# Patient Record
Sex: MALE | Race: BLACK OR AFRICAN AMERICAN | NOT HISPANIC OR LATINO | Employment: UNEMPLOYED | URBAN - METROPOLITAN AREA
[De-identification: names, ages, dates, MRNs, and addresses within clinical notes are randomized per-mention and may not be internally consistent; named-entity substitution may affect disease eponyms.]

---

## 2022-05-10 NOTE — TELEPHONE ENCOUNTER
DIAGNOSIS: L knee,Malcolm Jacobsen,[place] [facility] [place]past arthroscopic surgery 2018   APPOINTMENT DATE: 5.18.22   NOTES STATUS DETAILS   OFFICE NOTE from other specialist Care Everywhere 8.23.21 Jenni Herring, [facility]   OPERATIVE REPORT In process    MEDICATION LIST Internal    MRI PACS 1.28.22 L knee, [place]  9.17.15 L knee, [facility]  3.2.04 L knee, [facility]   XRAYS (IMAGES & REPORTS) PACS 8.23.21 L knee, [facility]  8.24.05 L knee, [facility]  5.1.03 L knee, [facility]     Action 5.10.22 1:27 PM BRU[facility]   Action Taken Faxed request to [place] for records [phone] and [facility] [phone] for images      Action 5.16.22 7:31 AM BRUCE   Action Taken [facility] images resolved. Refaxed request to [place]     Action 5.18.22 7:32 AM BRU[facility]   Action Taken Called [facility] to have image pushed. Resolved.

## 2022-05-18 ENCOUNTER — PRE VISIT (OUTPATIENT)
Dept: ORTHOPEDICS | Facility: CLINIC | Age: 32
End: 2022-05-18
Payer: COMMERCIAL

## 2022-05-18 ENCOUNTER — DOCUMENTATION ONLY (OUTPATIENT)
Dept: ORTHOPEDICS | Facility: CLINIC | Age: 32
End: 2022-05-18

## 2022-05-18 ENCOUNTER — OFFICE VISIT (OUTPATIENT)
Dept: ORTHOPEDICS | Facility: CLINIC | Age: 32
End: 2022-05-18
Payer: COMMERCIAL

## 2022-05-18 VITALS — HEIGHT: 71 IN | WEIGHT: 235 LBS | BODY MASS INDEX: 32.9 KG/M2

## 2022-05-18 DIAGNOSIS — M25.562 CHRONIC PAIN OF LEFT KNEE: Primary | ICD-10-CM

## 2022-05-18 DIAGNOSIS — G89.29 CHRONIC PAIN OF LEFT KNEE: Primary | ICD-10-CM

## 2022-05-18 PROCEDURE — 99204 OFFICE O/P NEW MOD 45 MIN: CPT | Mod: GC | Performed by: ORTHOPAEDIC SURGERY

## 2022-05-18 NOTE — LETTER
5/18/2022         RE: Misha Buckner  [address] [facility] [address]        Dear Colleague,    Thank you for referring your patient, Misha Buckner, to the [facility] [place]. Please see a copy of my visit note below.    CHIEF CONCERN: Left knee pain    HISTORY:   Misha is a pleasant 32-year-old gentleman who presents today for evaluation of ongoing left knee pain.  This has been going on since the end of 2021.  He reports initially feeling an injury after stepping wrong coming down the stairs since then has had issues.  He has had 2 prior surgeries on his left knee which she reports are arthroscopic surgeries unsure of ultimately what was done.  This most recent pain that he has been experiencing however is relatively new since those procedures.      Reports that he continues to have knee swelling and that he has difficulty standing on his feet for prolonged periods of time.  He reports because of this knee pain he has not been able to continue working.  Pain is predominantly on the medial aspect of his knee.  This has not improved over the last 6 months or so.  He rates his left knee a SANE score of 30 out of 100.    PAST MEDICAL HISTORY: (Reviewed with the patient and in the [facility] medical record)  1. No reported past medical history    PAST SURGICAL HISTORY: (Reviewed with the patient and in the [facility] medical record)  1. Reports 2 prior knee arthroscopies on the left knee    MEDICATIONS: (Reviewed with the patient and in the [facility] medical record)    Notable medications include: Lisinopril    ALLERGIES: (Reviewed with the patient and in the [facility] medical record)  1. No known allergies      SOCIAL HISTORY: (Reviewed with the patient and in the medical record)  --Tobacco: Denies  --Occupation: Previously doing [employer] work  --Avocation/Sport: Would like to get back to standing for longer periods of time like barbecuing, and return to basketball    FAMILY HISTORY:  (Reviewed with the patient and in the medical record)  -- No family history of bleeding, clotting, or difficulty with anesthesia    REVIEW OF SYSTEMS: (Reviewed with the patient and on the health intake form)  -- A comprehensive 10 point review of systems was conducted and is negative except as noted in the HPI    EXAM:     General: Awake, Alert and Oriented, No acute Distress. Articulate and Interactive    Body mass index is 32.78 kg/m .    Left lower extremity :    Skin is Warm and Well perfused, no suggestion of infection    No significant effusion    Range of motion from -2 to approximately 95 degrees prior to onset of some discomfort.  Cannot obtain further flexion with discomfort.    No pain with patellar mobility.    Negative Lachman.  Negative posterior drawer    Stable to varus and valgus stress at 0 and 30 degrees    No significant tenderness to medial or lateral joint line.    He does have exquisite tenderness to palpation of the medial femoral condyle as well as somewhat in the area of the VMO.    Equivocal Alie testing    EHL/FHL/TA/GS 5/5    Sensation intact L3-S1    2+ Dorsalis Pedis Pulse    IMAGING:    Plain Radiographs: Prior radiographs from August 2021 are available for review.  This demonstrates mild medial joint space narrowing as well as a medial femoral condyle osteochondral defect.    MRI: MRI of the left knee from January 2022 demonstrates predominantly a medial femoral condyle osteochondral defect.    ASSESSMENT:  1. Left knee medial femoral condyle osteochondral defect.    Examination and imaging findings were discussed with Misha today.  We discussed that the likelihood of his osteochondral defect healing without any intervention is very low.  We feel that intervention would be required for reliable symptom relief.  We discussed that this would likely be a staged approach, however we first offered a knee arthroscopy with debridement and ultimately evaluation of his osteochondral  defect for further planning.  We did discuss that if this were to alleviate his symptoms no further intervention would be offered.  Should he remain symptomatic, however a second procedure including osteochondral allograft would likely be necessary.  We will obtain full-length alignment radiographs today to evaluate for any possible need for osteotomy.  The risks and benefits of the above surgical procedures were thoroughly discussed with him and he would like to proceed.  We will plan for this first stage procedure at his convenience.  He will call us in the meantime with any questions or concerns.    PLAN:  1. Obtain standing alignment radiographs today  2. Plan for staged surgical procedures.  First procedure would be diagnostic left knee arthroscopy with chondroplasty.  This will allow us to evaluate his knee for potential further surgical procedures.   3. If his symptoms were to fail to improve we would likely follow in the future with osteochondral allograft.    Marcus Mancia MD  Orthopedic sports medicine fellow        Patient seen and examined with the fellow.     Assesment: Left knee pain, swelling and full-thickness chondral defect medial femoral condyle.  Focal in origin    Plan: I had a long discussion with the patient.  Reviewed with him his cartilage defect and the potential treatment options.  I have offered him a staged approach.  The surgical plan will be examination under anesthesia left knee, chondroplasty, staging for future cartilage surgery.  If he sees good improvement then no further intervention will be necessary.  If he fails to see lasting improvement I would recommend osteochondral allograft transplantation (likely) and proximal tibial osteotomy.    We do need to get standing alignment films today to confirm his lower extremity alignment.  Note we would not plan to couple an osteotomy surgery until the second cartilage surgery.    I discussed with him the pros cons risk and benefits of  surgery.  We discussed the expected course of recovery alternative treatment options.  We will look for time to schedule and this can be complete.    I agree with history, physical and imaging as well as the assessment and plan as detailed by Dr. Mancia.         Again, thank you for allowing me to participate in the care of your patient.        Sincerely,        Ralf Robertson MD

## 2022-05-18 NOTE — NURSING NOTE
Teaching Flowsheet   Relevant Diagnosis: Left knee arthroscopy, chondroplasty and debridement, staging for future cartilage surgeries  Teaching Topic: Pre operative instructions     Person(s) involved in teaching:   Patient     Motivation Level:  Asks Questions: Yes  Eager to Learn: Yes  Cooperative: Yes  Receptive (willing/able to accept information): Yes  Any cultural factors/Hinduism beliefs that may influence understanding or compliance? No       Patient demonstrates understanding of the following:  Reason for the appointment, diagnosis and treatment plan: Yes  Knowledge of proper use of medications and conditions for which they are ordered (with special attention to potential side effects or drug interactions): Yes  Which situations necessitate calling provider and whom to contact: Yes       Teaching Concerns Addressed: Pre operative care       Proper use and care of brace and crutches (medical equip, care aids, etc.): No, explain: will be provided at surgery  Nutritional needs and diet plan: Yes  Pain management techniques: Yes  Wound Care: Yes  How and/when to access community resources: Yes     Instructional Materials Used/Given: Pre operative instructions and surgical soap     Time spent with patient: 15 minutes.    **Patient is scheduled for surgery at the [facility] on 6/7/22. He understands that he will need a pre-operative physical within 30 days of surgery he plans to do this with his PCP. The patient also understands that he needs a covid test within 4 days of surgery.**

## 2022-05-18 NOTE — PROGRESS NOTES
CHIEF CONCERN: Left knee pain    HISTORY:   Misha is a pleasant 32-year-old gentleman who presents today for evaluation of ongoing left knee pain.  This has been going on since the end of 2021.  He reports initially feeling an injury after stepping wrong coming down the stairs since then has had issues.  He has had 2 prior surgeries on his left knee which she reports are arthroscopic surgeries unsure of ultimately what was done.  This most recent pain that he has been experiencing however is relatively new since those procedures.      Reports that he continues to have knee swelling and that he has difficulty standing on his feet for prolonged periods of time.  He reports because of this knee pain he has not been able to continue working.  Pain is predominantly on the medial aspect of his knee.  This has not improved over the last 6 months or so.  He rates his left knee a SANE score of 30 out of 100.    PAST MEDICAL HISTORY: (Reviewed with the patient and in the [facility] medical record)  1. No reported past medical history    PAST SURGICAL HISTORY: (Reviewed with the patient and in the [facility] medical record)  1. Reports 2 prior knee arthroscopies on the left knee    MEDICATIONS: (Reviewed with the patient and in the [facility] medical record)    Notable medications include: Lisinopril    ALLERGIES: (Reviewed with the patient and in the [facility] medical record)  1. No known allergies      SOCIAL HISTORY: (Reviewed with the patient and in the medical record)  --Tobacco: Denies  --Occupation: Previously doing [employer] work  --Avocation/Sport: Would like to get back to standing for longer periods of time like barbecuing, and return to basketball    FAMILY HISTORY: (Reviewed with the patient and in the medical record)  -- No family history of bleeding, clotting, or difficulty with anesthesia    REVIEW OF SYSTEMS: (Reviewed with the patient and on the health intake form)  -- A comprehensive 10 point review of systems was conducted and  is negative except as noted in the HPI    EXAM:     General: Awake, Alert and Oriented, No acute Distress. Articulate and Interactive    Body mass index is 32.78 kg/m .    Left lower extremity :    Skin is Warm and Well perfused, no suggestion of infection    No significant effusion    Range of motion from -2 to approximately 95 degrees prior to onset of some discomfort.  Cannot obtain further flexion with discomfort.    No pain with patellar mobility.    Negative Lachman.  Negative posterior drawer    Stable to varus and valgus stress at 0 and 30 degrees    No significant tenderness to medial or lateral joint line.    He does have exquisite tenderness to palpation of the medial femoral condyle as well as somewhat in the area of the VMO.    Equivocal Alie testing    EHL/FHL/TA/GS 5/5    Sensation intact L3-S1    2+ Dorsalis Pedis Pulse    IMAGING:    Plain Radiographs: Prior radiographs from August 2021 are available for review.  This demonstrates mild medial joint space narrowing as well as a medial femoral condyle osteochondral defect.    MRI: MRI of the left knee from January 2022 demonstrates predominantly a medial femoral condyle osteochondral defect.    ASSESSMENT:  1. Left knee medial femoral condyle osteochondral defect.    Examination and imaging findings were discussed with Misha today.  We discussed that the likelihood of his osteochondral defect healing without any intervention is very low.  We feel that intervention would be required for reliable symptom relief.  We discussed that this would likely be a staged approach, however we first offered a knee arthroscopy with debridement and ultimately evaluation of his osteochondral defect for further planning.  We did discuss that if this were to alleviate his symptoms no further intervention would be offered.  Should he remain symptomatic, however a second procedure including osteochondral allograft would likely be necessary.  We will obtain  full-length alignment radiographs today to evaluate for any possible need for osteotomy.  The risks and benefits of the above surgical procedures were thoroughly discussed with him and he would like to proceed.  We will plan for this first stage procedure at his convenience.  He will call us in the meantime with any questions or concerns.    PLAN:  1. Obtain standing alignment radiographs today  2. Plan for staged surgical procedures.  First procedure would be diagnostic left knee arthroscopy with chondroplasty.  This will allow us to evaluate his knee for potential further surgical procedures.   3. If his symptoms were to fail to improve we would likely follow in the future with osteochondral allograft.    Marcus Mancia MD  Orthopedic sports medicine fellow

## 2022-05-18 NOTE — PROGRESS NOTES
Patient seen and examined with the fellow.     Assesment: Left knee pain, swelling and full-thickness chondral defect medial femoral condyle.  Focal in origin    Plan: I had a long discussion with the patient.  Reviewed with him his cartilage defect and the potential treatment options.  I have offered him a staged approach.  The surgical plan will be examination under anesthesia left knee, chondroplasty, staging for future cartilage surgery.  If he sees good improvement then no further intervention will be necessary.  If he fails to see lasting improvement I would recommend osteochondral allograft transplantation (likely) and proximal tibial osteotomy.    We do need to get standing alignment films today to confirm his lower extremity alignment.  Note we would not plan to couple an osteotomy surgery until the second cartilage surgery.    I discussed with him the pros cons risk and benefits of surgery.  We discussed the expected course of recovery alternative treatment options.  We will look for time to schedule and this can be complete.    I agree with history, physical and imaging as well as the assessment and plan as detailed by Dr. Mancia.

## 2022-05-18 NOTE — PROGRESS NOTES
Patient is scheduled for surgery with Dr. Robertson    Spoke with: Meaghan, scheduled in clinic    Date of Surgery: 6/7/22    Location: [facility]    Post op: 1 week    Pre op with Provider: Complete    H&P: Patient will schedule with PCP    Pre-procedure COVID-19 Test: Patient will wait for call to schedule    Additional imaging/appointments: N/A    Surgery packet: Received in clinic     Additional comments: N/A

## 2022-05-27 DIAGNOSIS — Z11.59 ENCOUNTER FOR SCREENING FOR OTHER VIRAL DISEASES: Primary | ICD-10-CM

## 2022-06-01 ENCOUNTER — DOCUMENTATION ONLY (OUTPATIENT)
Dept: ORTHOPEDICS | Facility: CLINIC | Age: 32
End: 2022-06-01
Payer: COMMERCIAL

## 2022-06-03 ENCOUNTER — LAB (OUTPATIENT)
Dept: LAB | Facility: CLINIC | Age: 32
End: 2022-06-03
Attending: FAMILY MEDICINE
Payer: COMMERCIAL

## 2022-06-03 DIAGNOSIS — Z20.822 ENCOUNTER FOR LABORATORY TESTING FOR COVID-19 VIRUS: ICD-10-CM

## 2022-06-03 LAB — SARS-COV-2 RNA RESP QL NAA+PROBE: NEGATIVE

## 2022-06-03 PROCEDURE — U0003 INFECTIOUS AGENT DETECTION BY NUCLEIC ACID (DNA OR RNA); SEVERE ACUTE RESPIRATORY SYNDROME CORONAVIRUS 2 (SARS-COV-2) (CORONAVIRUS DISEASE [COVID-19]), AMPLIFIED PROBE TECHNIQUE, MAKING USE OF HIGH THROUGHPUT TECHNOLOGIES AS DESCRIBED BY CMS-2020-01-R: HCPCS | Mod: 90 | Performed by: PATHOLOGY

## 2022-06-03 PROCEDURE — U0005 INFEC AGEN DETEC AMPLI PROBE: HCPCS | Mod: 90 | Performed by: PATHOLOGY

## 2022-06-03 PROCEDURE — 99000 SPECIMEN HANDLING OFFICE-LAB: CPT | Performed by: PATHOLOGY

## 2022-06-06 ENCOUNTER — ANESTHESIA EVENT (OUTPATIENT)
Dept: SURGERY | Facility: AMBULATORY SURGERY CENTER | Age: 32
End: 2022-06-06
Payer: COMMERCIAL

## 2022-06-07 ENCOUNTER — HOSPITAL ENCOUNTER (OUTPATIENT)
Facility: AMBULATORY SURGERY CENTER | Age: 32
Discharge: HOME | End: 2022-06-07
Attending: ORTHOPAEDIC SURGERY
Payer: COMMERCIAL

## 2022-06-07 ENCOUNTER — ANESTHESIA (OUTPATIENT)
Dept: SURGERY | Facility: AMBULATORY SURGERY CENTER | Age: 32
End: 2022-06-07
Payer: COMMERCIAL

## 2022-06-07 VITALS
TEMPERATURE: 97 F | HEART RATE: 82 BPM | OXYGEN SATURATION: 96 % | SYSTOLIC BLOOD PRESSURE: 154 MMHG | RESPIRATION RATE: 14 BRPM | BODY MASS INDEX: 32.9 KG/M2 | HEIGHT: 71 IN | DIASTOLIC BLOOD PRESSURE: 104 MMHG | WEIGHT: 235 LBS

## 2022-06-07 DIAGNOSIS — M25.562 CHRONIC PAIN OF LEFT KNEE: ICD-10-CM

## 2022-06-07 DIAGNOSIS — G89.29 CHRONIC PAIN OF LEFT KNEE: ICD-10-CM

## 2022-06-07 PROCEDURE — 29877 ARTHRS KNEE SURG DBRDMT/SHVG: CPT | Mod: LT

## 2022-06-07 PROCEDURE — 29877 ARTHRS KNEE SURG DBRDMT/SHVG: CPT | Mod: LT | Performed by: ORTHOPAEDIC SURGERY

## 2022-06-07 RX ORDER — PROPOFOL 10 MG/ML
INJECTION, EMULSION INTRAVENOUS CONTINUOUS PRN
Status: DISCONTINUED | OUTPATIENT
Start: 2022-06-07 | End: 2022-06-07

## 2022-06-07 RX ORDER — LIDOCAINE HYDROCHLORIDE 20 MG/ML
INJECTION, SOLUTION INFILTRATION; PERINEURAL PRN
Status: DISCONTINUED | OUTPATIENT
Start: 2022-06-07 | End: 2022-06-07

## 2022-06-07 RX ORDER — OXYCODONE HYDROCHLORIDE 5 MG/1
5 TABLET ORAL EVERY 4 HOURS PRN
Status: DISCONTINUED | OUTPATIENT
Start: 2022-06-07 | End: 2022-06-08 | Stop reason: HOSPADM

## 2022-06-07 RX ORDER — CEFAZOLIN SODIUM 2 G/50ML
2 SOLUTION INTRAVENOUS SEE ADMIN INSTRUCTIONS
Status: DISCONTINUED | OUTPATIENT
Start: 2022-06-07 | End: 2022-06-07 | Stop reason: HOSPADM

## 2022-06-07 RX ORDER — AMLODIPINE BESYLATE 10 MG/1
10 TABLET ORAL DAILY
COMMUNITY
Start: 2022-06-06 | End: 2024-01-11

## 2022-06-07 RX ORDER — ACETAMINOPHEN 325 MG/1
975 TABLET ORAL ONCE
Status: COMPLETED | OUTPATIENT
Start: 2022-06-07 | End: 2022-06-07

## 2022-06-07 RX ORDER — ONDANSETRON 4 MG/1
4 TABLET, ORALLY DISINTEGRATING ORAL EVERY 30 MIN PRN
Status: DISCONTINUED | OUTPATIENT
Start: 2022-06-07 | End: 2022-06-08 | Stop reason: HOSPADM

## 2022-06-07 RX ORDER — OXYCODONE HYDROCHLORIDE 5 MG/1
5 TABLET ORAL
Status: COMPLETED | OUTPATIENT
Start: 2022-06-07 | End: 2022-06-07

## 2022-06-07 RX ORDER — SODIUM CHLORIDE, SODIUM LACTATE, POTASSIUM CHLORIDE, CALCIUM CHLORIDE 600; 310; 30; 20 MG/100ML; MG/100ML; MG/100ML; MG/100ML
INJECTION, SOLUTION INTRAVENOUS CONTINUOUS PRN
Status: DISCONTINUED | OUTPATIENT
Start: 2022-06-07 | End: 2022-06-07

## 2022-06-07 RX ORDER — FENTANYL CITRATE 50 UG/ML
25 INJECTION, SOLUTION INTRAMUSCULAR; INTRAVENOUS
Status: DISCONTINUED | OUTPATIENT
Start: 2022-06-07 | End: 2022-06-08 | Stop reason: HOSPADM

## 2022-06-07 RX ORDER — PROPOFOL 10 MG/ML
INJECTION, EMULSION INTRAVENOUS PRN
Status: DISCONTINUED | OUTPATIENT
Start: 2022-06-07 | End: 2022-06-07

## 2022-06-07 RX ORDER — HYDROMORPHONE HYDROCHLORIDE 1 MG/ML
0.2 INJECTION, SOLUTION INTRAMUSCULAR; INTRAVENOUS; SUBCUTANEOUS EVERY 5 MIN PRN
Status: DISCONTINUED | OUTPATIENT
Start: 2022-06-07 | End: 2022-06-07 | Stop reason: HOSPADM

## 2022-06-07 RX ORDER — SODIUM CHLORIDE, SODIUM LACTATE, POTASSIUM CHLORIDE, CALCIUM CHLORIDE 600; 310; 30; 20 MG/100ML; MG/100ML; MG/100ML; MG/100ML
INJECTION, SOLUTION INTRAVENOUS CONTINUOUS
Status: DISCONTINUED | OUTPATIENT
Start: 2022-06-07 | End: 2022-06-08 | Stop reason: HOSPADM

## 2022-06-07 RX ORDER — ONDANSETRON 4 MG/1
4 TABLET, ORALLY DISINTEGRATING ORAL
Status: DISCONTINUED | OUTPATIENT
Start: 2022-06-07 | End: 2022-06-08 | Stop reason: HOSPADM

## 2022-06-07 RX ORDER — OXYCODONE HYDROCHLORIDE 5 MG/1
5-10 TABLET ORAL EVERY 4 HOURS PRN
Qty: 10 TABLET | Refills: 0 | Status: ON HOLD | OUTPATIENT
Start: 2022-06-07 | End: 2022-10-12

## 2022-06-07 RX ORDER — LIDOCAINE 40 MG/G
CREAM TOPICAL
Status: DISCONTINUED | OUTPATIENT
Start: 2022-06-07 | End: 2022-06-07 | Stop reason: HOSPADM

## 2022-06-07 RX ORDER — ACETAMINOPHEN 325 MG/1
650 TABLET ORAL EVERY 4 HOURS PRN
Qty: 50 TABLET | Refills: 1 | Status: ON HOLD | OUTPATIENT
Start: 2022-06-07 | End: 2022-10-12

## 2022-06-07 RX ORDER — KETOROLAC TROMETHAMINE 30 MG/ML
INJECTION, SOLUTION INTRAMUSCULAR; INTRAVENOUS PRN
Status: DISCONTINUED | OUTPATIENT
Start: 2022-06-07 | End: 2022-06-07

## 2022-06-07 RX ORDER — HYDROXYZINE HYDROCHLORIDE 25 MG/1
25 TABLET, FILM COATED ORAL
Status: DISCONTINUED | OUTPATIENT
Start: 2022-06-07 | End: 2022-06-08 | Stop reason: HOSPADM

## 2022-06-07 RX ORDER — ONDANSETRON 2 MG/ML
4 INJECTION INTRAMUSCULAR; INTRAVENOUS EVERY 30 MIN PRN
Status: DISCONTINUED | OUTPATIENT
Start: 2022-06-07 | End: 2022-06-08 | Stop reason: HOSPADM

## 2022-06-07 RX ORDER — AMOXICILLIN 250 MG
1-2 CAPSULE ORAL 2 TIMES DAILY
Qty: 30 TABLET | Refills: 0 | Status: ON HOLD | OUTPATIENT
Start: 2022-06-07 | End: 2022-10-12

## 2022-06-07 RX ORDER — ACETAMINOPHEN 325 MG/1
975 TABLET ORAL ONCE
Status: DISCONTINUED | OUTPATIENT
Start: 2022-06-07 | End: 2022-06-07 | Stop reason: HOSPADM

## 2022-06-07 RX ORDER — FENTANYL CITRATE 50 UG/ML
25 INJECTION, SOLUTION INTRAMUSCULAR; INTRAVENOUS EVERY 5 MIN PRN
Status: DISCONTINUED | OUTPATIENT
Start: 2022-06-07 | End: 2022-06-07 | Stop reason: HOSPADM

## 2022-06-07 RX ORDER — CEFAZOLIN SODIUM 2 G/50ML
2 SOLUTION INTRAVENOUS
Status: DISCONTINUED | OUTPATIENT
Start: 2022-06-07 | End: 2022-06-07 | Stop reason: HOSPADM

## 2022-06-07 RX ORDER — LABETALOL HYDROCHLORIDE 5 MG/ML
10 INJECTION, SOLUTION INTRAVENOUS
Status: DISCONTINUED | OUTPATIENT
Start: 2022-06-07 | End: 2022-06-07 | Stop reason: HOSPADM

## 2022-06-07 RX ORDER — SODIUM CHLORIDE, SODIUM LACTATE, POTASSIUM CHLORIDE, CALCIUM CHLORIDE 600; 310; 30; 20 MG/100ML; MG/100ML; MG/100ML; MG/100ML
INJECTION, SOLUTION INTRAVENOUS CONTINUOUS
Status: DISCONTINUED | OUTPATIENT
Start: 2022-06-07 | End: 2022-06-07 | Stop reason: HOSPADM

## 2022-06-07 RX ORDER — GLYCOPYRROLATE 0.2 MG/ML
INJECTION, SOLUTION INTRAMUSCULAR; INTRAVENOUS PRN
Status: DISCONTINUED | OUTPATIENT
Start: 2022-06-07 | End: 2022-06-07

## 2022-06-07 RX ORDER — MEPERIDINE HYDROCHLORIDE 25 MG/ML
12.5 INJECTION INTRAMUSCULAR; INTRAVENOUS; SUBCUTANEOUS
Status: DISCONTINUED | OUTPATIENT
Start: 2022-06-07 | End: 2022-06-08 | Stop reason: HOSPADM

## 2022-06-07 RX ORDER — FENTANYL CITRATE 50 UG/ML
INJECTION, SOLUTION INTRAMUSCULAR; INTRAVENOUS PRN
Status: DISCONTINUED | OUTPATIENT
Start: 2022-06-07 | End: 2022-06-07

## 2022-06-07 RX ORDER — ONDANSETRON 2 MG/ML
INJECTION INTRAMUSCULAR; INTRAVENOUS PRN
Status: DISCONTINUED | OUTPATIENT
Start: 2022-06-07 | End: 2022-06-07

## 2022-06-07 RX ORDER — DEXAMETHASONE SODIUM PHOSPHATE 4 MG/ML
INJECTION, SOLUTION INTRA-ARTICULAR; INTRALESIONAL; INTRAMUSCULAR; INTRAVENOUS; SOFT TISSUE PRN
Status: DISCONTINUED | OUTPATIENT
Start: 2022-06-07 | End: 2022-06-07

## 2022-06-07 RX ORDER — BUPIVACAINE HYDROCHLORIDE AND EPINEPHRINE 2.5; 5 MG/ML; UG/ML
INJECTION, SOLUTION INFILTRATION; PERINEURAL PRN
Status: DISCONTINUED | OUTPATIENT
Start: 2022-06-07 | End: 2022-06-07 | Stop reason: HOSPADM

## 2022-06-07 RX ORDER — ACETAMINOPHEN 325 MG/1
650 TABLET ORAL
Status: DISCONTINUED | OUTPATIENT
Start: 2022-06-07 | End: 2022-06-08 | Stop reason: HOSPADM

## 2022-06-07 RX ADMIN — PROPOFOL 300 MG: 10 INJECTION, EMULSION INTRAVENOUS at 11:55

## 2022-06-07 RX ADMIN — LIDOCAINE HYDROCHLORIDE 100 MG: 20 INJECTION, SOLUTION INFILTRATION; PERINEURAL at 11:55

## 2022-06-07 RX ADMIN — CEFAZOLIN SODIUM 2 G: 2 SOLUTION INTRAVENOUS at 11:54

## 2022-06-07 RX ADMIN — FENTANYL CITRATE 50 MCG: 50 INJECTION, SOLUTION INTRAMUSCULAR; INTRAVENOUS at 11:58

## 2022-06-07 RX ADMIN — KETOROLAC TROMETHAMINE 30 MG: 30 INJECTION, SOLUTION INTRAMUSCULAR; INTRAVENOUS at 12:26

## 2022-06-07 RX ADMIN — SODIUM CHLORIDE, SODIUM LACTATE, POTASSIUM CHLORIDE, CALCIUM CHLORIDE: 600; 310; 30; 20 INJECTION, SOLUTION INTRAVENOUS at 11:49

## 2022-06-07 RX ADMIN — PROPOFOL 150 MCG/KG/MIN: 10 INJECTION, EMULSION INTRAVENOUS at 11:55

## 2022-06-07 RX ADMIN — ONDANSETRON 4 MG: 2 INJECTION INTRAMUSCULAR; INTRAVENOUS at 11:58

## 2022-06-07 RX ADMIN — DEXAMETHASONE SODIUM PHOSPHATE 4 MG: 4 INJECTION, SOLUTION INTRA-ARTICULAR; INTRALESIONAL; INTRAMUSCULAR; INTRAVENOUS; SOFT TISSUE at 11:58

## 2022-06-07 RX ADMIN — OXYCODONE HYDROCHLORIDE 5 MG: 5 TABLET ORAL at 13:13

## 2022-06-07 RX ADMIN — ACETAMINOPHEN 975 MG: 325 TABLET ORAL at 11:13

## 2022-06-07 RX ADMIN — GLYCOPYRROLATE 0.2 MG: 0.2 INJECTION, SOLUTION INTRAMUSCULAR; INTRAVENOUS at 11:55

## 2022-06-07 ASSESSMENT — LIFESTYLE VARIABLES: TOBACCO_USE: 1

## 2022-06-07 NOTE — ANESTHESIA PREPROCEDURE EVALUATION
Anesthesia Pre-Procedure Evaluation    Patient: Misha Buckner   MRN: [id number] : 1990        Procedure : Procedure(s):  Examination under anesthesia, knee arthroscopy, chondroplasty and debridement.  Staging for future cartilage surgery          No past medical history on file.   No past surgical history on file.   No Known Allergies   Social History     Tobacco Use     Smoking status: Current Every Day Smoker     Packs/day: 1.50     Types: Cigarettes     Smokeless tobacco: Never Used   Substance Use Topics     Alcohol use: Not on file      Wt Readings from Last 1 Encounters:   22 106.6 kg (235 lb)        Anesthesia Evaluation   Pt has had prior anesthetic. Type: General.    No history of anesthetic complications       ROS/MED HX  ENT/Pulmonary:     (+) tobacco use, Current use,     Neurologic:  - neg neurologic ROS     Cardiovascular:     (+) hypertension-----    METS/Exercise Tolerance:     Hematologic:  - neg hematologic  ROS     Musculoskeletal: Comment: Chronic left knee pain      GI/Hepatic:  - neg GI/hepatic ROS     Renal/Genitourinary:  - neg Renal ROS     Endo:  - neg endo ROS     Psychiatric/Substance Use:  - neg psychiatric ROS   (+) Recreational drug usage: Cannabis.    Infectious Disease:  - neg infectious disease ROS     Malignancy:  - neg malignancy ROS     Other:  - neg other ROS          Physical Exam    Airway        Mallampati: II   TM distance: > 3 FB   Neck ROM: full   Mouth opening: > 3 cm    Respiratory Devices and Support         Dental  no notable dental history         Cardiovascular   cardiovascular exam normal          Pulmonary   pulmonary exam normal                OUTSIDE LABS:  CBC:   Lab Results   Component Value Date    WBC 5.3 2011    HGB 14.4 2011    HCT 41.9 2011     2011     BMP:   Lab Results   Component Value Date    .0 2011    POTASSIUM 4.0 2011    CHLORIDE 103.0 2011    CO2 28.0 2011    BUN 7.0  12/13/2011    CR 1.0 12/13/2011    GLC 99.0 12/13/2011     COAGS: No results found for: PTT, INR, FIBR  POC: No results found for: BGM, HCG, HCGS  HEPATIC: No results found for: ALBUMIN, PROTTOTAL, ALT, AST, GGT, ALKPHOS, BILITOTAL, BILIDIRECT, WILDER  OTHER:   Lab Results   Component Value Date    CARRILLO 8.9 12/13/2011       Anesthesia Plan    ASA Status:  1   NPO Status:  NPO Appropriate    Anesthesia Type: General.     - Airway: LMA      Maintenance: TIVA.        Consents         - Extended Intubation/Ventilatory Support Discussed: No.      - Patient is DNR/DNI Status: No    Use of blood products discussed: No .     Postoperative Care    Pain management: IV analgesics, Oral pain medications, Multi-modal analgesia.   PONV prophylaxis: Ondansetron (or other 5HT-3), Dexamethasone or Solumedrol, Background Propofol Infusion     Comments:                Gwendolyn Almanza MD

## 2022-06-07 NOTE — ANESTHESIA CARE TRANSFER NOTE
Patient: Misha Buckner    Procedure: Procedure(s):  left knee examination under anesthesia, arthroscopy, chondroplasty and debridement.  Staging for future cartilage surgery       Diagnosis: Chronic pain of left knee [M25.562, G89.29]  Diagnosis Additional Information: No value filed.    Anesthesia Type:   General     Note:      Level of Consciousness: awake  Oxygen Supplementation: face mask    Independent Airway: airway patency satisfactory and stable        Patient transferred to: PACU    Handoff Report: Identifed the Patient, Identified the Reponsible Provider, Reviewed the pertinent medical history, Discussed the surgical course, Reviewed Intra-OP anesthesia mangement and issues during anesthesia, Set expectations for post-procedure period and Allowed opportunity for questions and acknowledgement of understanding      Vitals:  Vitals Value Taken Time   BP     Temp 36.2  C (97.2  F) 06/07/22 1256   Pulse 78 06/07/22 1257   Resp 16 06/07/22 1257   SpO2 99 % 06/07/22 1257   Vitals shown include unvalidated device data.    Electronically Signed By: JARRED Galloway CRNA  June 7, 2022  12:59 PM

## 2022-06-07 NOTE — ANESTHESIA POSTPROCEDURE EVALUATION
Patient: Misha Buckner    Procedure: Procedure(s):  left knee examination under anesthesia, arthroscopy, chondroplasty and debridement.  Staging for future cartilage surgery       Anesthesia Type:  General    Note:  Disposition: Outpatient   Postop Pain Control: Uneventful            Sign Out: Well controlled pain   PONV: No   Neuro/Psych: Uneventful            Sign Out: Acceptable/Baseline neuro status   Airway/Respiratory: Uneventful            Sign Out: Acceptable/Baseline resp. status   CV/Hemodynamics: Uneventful            Sign Out: Acceptable CV status; No obvious hypovolemia; No obvious fluid overload   Other NRE: NONE   DID A NON-ROUTINE EVENT OCCUR? No    Event details/Postop Comments:  BP within range of preop blood pressure.           Last vitals:  Vitals Value Taken Time   /100 06/07/22 1315   Temp 36.2  C (97.2  F) 06/07/22 1315   Pulse 74 06/07/22 1318   Resp 10 06/07/22 1318   SpO2 98 % 06/07/22 1318   Vitals shown include unvalidated device data.    Electronically Signed By: Shira Webb MD  June 7, 2022  3:45 PM

## 2022-06-07 NOTE — OP NOTE
PREOPERATIVE DIAGNOSIS:   1. Medial femoral condyle chondral defect left knee    POSTOPERATIVE DIAGNOSIS:  1. Medial femoral condyle chondral defect 2 x 2 cm , grade 3-4 change left knee  2. Intact medial meniscus, intact medial tibial cartilage.  Normal lateral compartment and patellofemoral compartment    PROCEDURE:  1. Examination under anesthesia left knee  2. Left knee arthroscopy  3. Chondroplasty medial femoral condyle    DATE OF SURGERY: 6/7/2022    SURGEON: Ralf Robertson MD    ASSISTANT: Galdino Guzman PA-C. The Assistance of Ms. Guzman was necessary for patient positioning, arthroscopic visualization, and chondroplasty.     RESIDENT OR FELLOW: Delta Vale MD    OPERATIVE INDICATIONS: Misha Buckner is a pleasant 32 year old who I saw through my orthopedic clinic with a history, physical, imaging consistent with left medial knee pain.  MRI imaging showed full-thickness chondral defect medial femoral condyle.  I discussed with him my thoughts regarding cartilage reconstructive surgery.  We discussed the pros cons risk and benefits.  He understood the limitations of surgery.  He desired to proceed despite the risks..  I reviewed with the patient the risks, benefits, complications, techniques and alternatives to surgery.  We reviewed the expected course of recovery and the potential expected outcomes.  The patient understood both the risks and benefits and desired to proceed despite the risks.    OPERATIVE DETAILS: In the preoperative area the patient's informed consent was reviewed and they desired to proceed.  The left leg was marked and the patient was in agreement.  The patient was taken to the operating room where a timeout was performed and all parties were in agreement.  Preoperative antibiotics were given within 1 hour of the time of incision.  The patient was placed in the supine position and surrendered to LMA anesthesia.  No tourniquet was applied.  Egg crate was placed beneath the well leg  and a side post was utilized.  The operative leg was prepped and draped in the usual sterile fashion.     Examination Under Anesthesia:    Range of motion 0 to 135 degrees.  Stable varus valgus stress testing.  Stable anterior posterior drawer testing.  No pivot shift.  Lachman 0.    Anterior medial anterolateral arthroscopic portal.  And a diagnostic arthroscopy was performed with the following findings: There were no loose bodies in the suprapatellar pouch, medial gutter, lateral gutter.  Lateral femoral condyle, lateral tibial plateau, lateral meniscus normal.  Medial patella facet central ridge lateral patella facet central trochlea normal.  Medial tibial plateau normal.  Medial meniscus normal.  Grade 3 possibly grade 4 chondral defect flexion zone of the medial femoral condyle 2 x 2 cm.    At this time a shaver was introduced and a chondroplasty was performed until about stable rim of cartilage remained.    Copious irrigation was performed an a layered closure was initiated, sterile dressings were applied and the patient was transferred to the recovery room in stable condition with stable vital signs.    ESTIMATED BLOOD LOSS: 5 mL.    TOURNIQUET TIME: No tourniquet was placed.    COMPLICATIONS: None apparent.    DRAINS: None.    SPECIMENS: None.     POSTOPERATIVE PLAN:  1. Weightbearing as tolerated  2. Range of motion as tolerated  3. No sweating for 1 week.  After 1 week return to nonpulmonary methods of exercise as able no running or jumping until 6 weeks  4. Going forward the patient does well then no further intervention is necessary.  5. If the patient fails to see lasting improvement I would consider osteochondral allograft transplantation of the medial femoral condyle plus or minus proximal tibial osteotomy as determined by standing 6 foot alignment films.  I would plan to do this in approximately 6 weeks.

## 2022-06-07 NOTE — DISCHARGE INSTRUCTIONS
[facility]  Home Care Following Anesthesia  For 24 hours after surgery:  Get plenty of rest.  A responsible adult must stay with you for at least 24 hours after you leave the surgery center.  Do not drive or use heavy equipment.  If you have weakness or tingling, don't drive or use heavy equipment until this feeling goes away.   Do not drink alcohol.   Avoid strenuous or risky activities.  Ask for help when climbing stairs.  You may feel lightheaded.  IF so, sit for a few minutes before standing.  Have someone help you get up.   If you have nausea (feel sick to your stomach): Drink only clear liquids such as apple juice, ginger ale, broth or 7-Up.  Rest may also help.  Be sure to drink enough fluids.  Move to a regular diet as you feel able.   You may have a slight fever.  Call the doctor if your fever is over 100 F (37.7 C) (taken under the tongue) or lasts longer than 24 hours.  You may have a dry mouth, a sore throat, muscle aches or trouble sleeping. These should go away after 24 hours.  Do not make important or legal decisions.   It is recommended to avoid smoking.               Tips for taking pain medications  To get the best pain relief possible, remember these points:  Take pain medications as directed, before pain becomes severe.  Pain medication can upset your stomach: taking it with food may help.  Constipation is a common side effect of pain medication. Drink plenty of  fluids.  Eat foods high in fiber. Take a stool softener if recommended by your doctor or pharmacist.  Do not drink alcohol, drive or operate machinery while taking pain medications.  Ask about other ways to control pain, such as with heat, ice or relaxation.    Tylenol/Acetaminophen Consumption  To help encourage the safe use of acetaminophen, the makers of TYLENOL  have lowered the maximum daily dose for single-ingredient Extra Strength TYLENOL  (acetaminophen) products sold in the [place] from 8 pills  per day (4,000 mg) to 6 pills per day (3,000 mg). The dosing interval has also changed from 2 pills every 4-6 hours to 2 pills every 6 hours.  If you feel your pain relief is insufficient, you may take Tylenol/Acetaminophen in addition to your narcotic pain medication.   Be careful not to exceed 3,000 mg of Tylenol/Acetaminophen in a 24 hour period from all sources.  If you are taking extra strength Tylenol/acetaminophen (500 mg), the maximum dose is 6 tablets in 24 hours.  If you are taking regular strength acetaminophen (325 mg), the maximum dose is 9 tablets in 24 hours.    Call a doctor for any of the following:  Signs of infection (fever, growing tenderness at the surgery site, a large amount of drainage or bleeding, severe pain, foul-smelling drainage, redness, swelling).  It has been over 8 to 10 hours since surgery and you are still not able to urinate (pass water).  Headache for over 24 hours.  Numbness, tingling or weakness the day after surgery (if you had spinal anesthesia).  Signs of Covid-19 infection (temperature over 100 degrees, shortness of breath, cough, loss of taste/smell, generalized body aches, persistent headache, chills, sore throat, nausea/vomiting/diarrhea)  Your doctor is:       Dr. Ralf Robertson, Orthopaedics: [phone]               Or dial [phone] and ask for the resident on call for:  Orthopaedics  For emergency care, call the:  [facility] Emergency Department: [phone] (TTY for hearing impaired: [phone])                     Safety Tips for Using Crutches    Crutch Fit:  Assume good standing posture with shoulders relaxed and crutch tips 6-8 inches out from the side of the foot.  The underarm pad should fall 2-3 fingers width below the armpit.  The handgrip is positioned level with the wrist to allow 30  flexion at the elbow.    Safety Tips:  Bear weight on your hands, not on your armpits.  Do not add extra padding to the underarm pad. This will, in effect, lengthen  "the crutches and increase risk of nerve injury.  Wear flat, properly fitting shoes. Do not walk in stocking feet, high heels or slippers.  Household hazards:  --Throw rugs should be removed from floors.  --Stairs should be cleared of obstacles.  --Use extra caution on slippery, highly polished, littered or uneven floor surfaces.  --Check for electric cords.  Check crutch tips for excessive wear and keep wing nuts tight.  While walking, look forward with  head up  and  eyes open.  Take equal length steps.  Use BOTH crutches.    Stairs Sequence:  UP: \"Good\" leg first, followed by  bad  leg, then crutches.  DOWN: Crutches, followed by  bad  leg, \"good\" leg.     Walking with Crutches:  Move both crutches forward at the same time.  Non-Weight Bearing (NWB):  Hold the involved leg up and swing through the crutches with the involved leg. The involved leg does not touch the floor.  Toe Touch Weight Bearing (TTWB): Move the involved leg forward. Rest it lightly on the floor for balance only. Step through the crutches with the uninvolved leg.  Partial Weight Bearing (PWB): Move the involved leg forward. Step down the weight of the leg only.  Step through the crutches with the uninvolved leg.  Weight Bearing As Tolerated (WBAT): Move the involved leg forward. Put as much pressure through the involved leg as you can tolerate comfortably. Then step through the crutches with the uninvolved leg.   "

## 2022-06-07 NOTE — BRIEF OP NOTE
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  Staging for future cartilage surgery   Surgeon(s): Surgeon(s) and Role:     * Ralf Robertson MD - Primary     * Delta Vale MD - Resident - Assisting   Estimated blood loss: 2 mL    Specimens: * No specimens in log *   Findings: See full op report     Plan:  SDS  Activity: Up with assist.  Weight bearing status: WBAT  Antibiotics: Ancef preop  Diet: Begin with clear fluids and progress diet as tolerated.  DVT prophylaxis: Mechanical.   Bracing/Splinting: None  Elevation: Elevate op extremity on pillows as much as possible.  Wound Care: Dressing until POD3  Drains: none  Pain management: multimodal pain medications  Imaging: none  Follow-up: Clinic with Dr. Robertson's team in 7-10 days  Disposition: PACU, OK to discharge once meets criteria in PACU.    Delta Vale MD  Orthopaedic Surgery PGY5

## 2022-06-09 NOTE — PROGRESS NOTES
Physical Therapy Initial Evaluation: Subjective History  Date of Surgery: 6/7/22.    Surgical Procedure/Limb: Left Knee arthroscopy, chondroplasty debridement   Surgeon Name: Dr. Robertson  Precautions/Restrictions: WBAT, ROM as tolerated, no sweating for 1 week. No running or jumping until 6 weeks.   Average Daily Pain Levels: 7/10 (Location: anterior; Quality: Burning and Aching/Throbbing)  Other Symptoms: no numbness/tingling  Symptom Mgmt Strategies: icing, elevation, oxycodone    Prior orthopaedic history/procedures: 2 previous left knee arthroscopic surgeries (pt reports surgeries in 2015, 2018. Per Epic had Left knee arthroscopic anterior synovectomy in 2010).   Prior non-operative management: PT after previous surgeries  Next MD Appt Date: 6 weeks    Functional limitations following procedure: walking, standing, stairs  Previous level of function: Since end of 2021 limited by L knee pain and swelling which started after stepping of the stairs wrong and was not able to continue working. SANE score 30/100    Patient Employment:    Desired Physical Activity (Goals): Work full time, play football, basketball, kickball       Knee Evaluation    DVT Assessment: Wells Criteria- bold indicates present   Tenderness in Calf Leg pain    Pitting Edema  Red and Hot Skin    Fever  Calf/Ankle Swelling       Incision Observation: Clean, Dry and Intact    Gait: WBAT with bilateral crutches  Transfers: independent onto plinth    Knee ROM Extension Flexion   Left 0 63   Right 4 hyperext 120       Edema: (cm)  Joint Line 5cm above   Left 45.4 52   Right 41 46   Stroke test for knee effusion: 3+      Knee MMT Quadriceps set Straight Leg Raise   Left Poor NA   Right Good Good        Assessment/Plan:  Patient is a 32 year old male with left side knee complaints.    Patient has the following significant findings with corresponding treatment plan.                Diagnosis 1:  S/p L knee arthroscopy, chondroplasty  debriedement  Pain -  hot/cold therapy, electric stimulation, splint/taping/bracing/orthotics, self management, education and home program  Decreased ROM/flexibility - manual therapy, therapeutic exercise and home program  Decreased joint mobility - manual therapy, therapeutic exercise and home program  Decreased strength - therapeutic exercise, therapeutic activities and home program  Edema - vasopneumatics and self management/home program  Impaired gait - gait training and home program  Impaired muscle performance - electric stimulation, neuro re-education and home program  Decreased function - therapeutic activities and home program    Therapy Evaluation Codes:   1) History comprised of:   Personal factors that impact the plan of care:      Time since onset of symptoms.    Comorbidity factors that impact the plan of care are:      Smoking.     Medications impacting care: None.  2) Examination of Body Systems comprised of:   Body structures and functions that impact the plan of care:      Knee.   Activity limitations that impact the plan of care are:      Jumping, Running, Sports, Squatting/kneeling, Stairs, Standing and Walking.  3) Clinical presentation characteristics are:   Stable/Uncomplicated.  4) Decision-Making    Low complexity using standardized patient assessment instrument and/or measureable assessment of functional outcome.  Cumulative Therapy Evaluation is: Low complexity.    Previous and current functional limitations:  (See Goal Flow Sheet for this information)    Short term and Long term goals: (See Goal Flow Sheet for this information)     Communication ability:  Patient appears to be able to clearly communicate and understand verbal and written communication and follow directions correctly.  Treatment Explanation - The following has been discussed with the patient:   RX ordered/plan of care  Anticipated outcomes  Possible risks and side effects  This patient would benefit from PT intervention to  resume normal activities.   Rehab potential is good.    Frequency:  2 X week, once daily  Duration:  for 2 weeks tapering to 1 X a week over 8 weeks  Discharge Plan:  Achieve all LTG.  Independent in home treatment program.  Reach maximal therapeutic benefit.    Please refer to the daily flowsheet for treatment today, total treatment time and time spent performing 1:1 timed codes.

## 2022-06-10 ENCOUNTER — THERAPY VISIT (OUTPATIENT)
Dept: PHYSICAL THERAPY | Facility: CLINIC | Age: 32
End: 2022-06-10
Attending: ORTHOPAEDIC SURGERY
Payer: COMMERCIAL

## 2022-06-10 DIAGNOSIS — M25.562 CHRONIC PAIN OF LEFT KNEE: ICD-10-CM

## 2022-06-10 DIAGNOSIS — G89.29 CHRONIC PAIN OF LEFT KNEE: ICD-10-CM

## 2022-06-10 PROCEDURE — 97161 PT EVAL LOW COMPLEX 20 MIN: CPT | Mod: GP

## 2022-06-10 PROCEDURE — 97110 THERAPEUTIC EXERCISES: CPT | Mod: GP

## 2022-06-10 PROCEDURE — 97140 MANUAL THERAPY 1/> REGIONS: CPT | Mod: GP

## 2022-06-10 ASSESSMENT — ACTIVITIES OF DAILY LIVING (ADL)
HOW_WOULD_YOU_RATE_THE_OVERALL_FUNCTION_OF_YOUR_KNEE_DURING_YOUR_USUAL_DAILY_ACTIVITIES?: SEVERELY ABNORMAL
STAND: ACTIVITY IS MINIMALLY DIFFICULT
STIFFNESS: THE SYMPTOM AFFECTS MY ACTIVITY SEVERELY
KNEEL ON THE FRONT OF YOUR KNEE: ACTIVITY IS VERY DIFFICULT
RAW_SCORE: 24
SWELLING: THE SYMPTOM AFFECTS MY ACTIVITY SEVERELY
AS_A_RESULT_OF_YOUR_KNEE_INJURY,_HOW_WOULD_YOU_RATE_YOUR_CURRENT_LEVEL_OF_DAILY_ACTIVITY?: SEVERELY ABNORMAL
WALK: ACTIVITY IS FAIRLY DIFFICULT
GO DOWN STAIRS: ACTIVITY IS VERY DIFFICULT
GO UP STAIRS: ACTIVITY IS FAIRLY DIFFICULT
KNEE_ACTIVITY_OF_DAILY_LIVING_SCORE: 34.29
KNEE_ACTIVITY_OF_DAILY_LIVING_SUM: 24
LIMPING: THE SYMPTOM AFFECTS MY ACTIVITY SEVERELY
WEAKNESS: THE SYMPTOM AFFECTS MY ACTIVITY MODERATELY
HOW_WOULD_YOU_RATE_THE_CURRENT_FUNCTION_OF_YOUR_KNEE_DURING_YOUR_USUAL_DAILY_ACTIVITIES_ON_A_SCALE_FROM_0_TO_100_WITH_100_BEING_YOUR_LEVEL_OF_KNEE_FUNCTION_PRIOR_TO_YOUR_INJURY_AND_0_BEING_THE_INABILITY_TO_PERFORM_ANY_OF_YOUR_USUAL_DAILY_ACTIVITIES?: 15
GIVING WAY, BUCKLING OR SHIFTING OF KNEE: THE SYMPTOM AFFECTS MY ACTIVITY SLIGHTLY
SIT WITH YOUR KNEE BENT: ACTIVITY IS FAIRLY DIFFICULT
SQUAT: ACTIVITY IS VERY DIFFICULT
RISE FROM A CHAIR: ACTIVITY IS FAIRLY DIFFICULT
PAIN: THE SYMPTOM AFFECTS MY ACTIVITY SEVERELY

## 2022-06-10 NOTE — PROGRESS NOTES
[facility]    OUTPATIENT Physical Therapy ORTHOPEDIC EVALUATION  PLAN OF TREATMENT FOR OUTPATIENT REHABILITATION  (COMPLETE FOR INITIAL CLAIMS ONLY)  Patient's Last Name, First Name, M.I.  YOB: 1990  Misha Buckner    Provider s Name:  [facility]   Medical Record No.  [id number]   Start of Care Date:  06/10/22   Onset Date:  06/07/22    Type:     _X__PT   ___OT Medical Diagnosis:    Encounter Diagnosis   Name Primary?    Chronic pain of left knee         Treatment Diagnosis:  L knee arthroscopy, chondroplasty debridement        Goals:     06/10/22 0500   Body Part   Goals listed below are for L knee   Goal #1   Goal #1 ambulation   Previous Functional Level No restrictions   Current Functional Level Minutes patient can walk   Performance Level 5-10 min with B crutches WBAT   STG Target Performance Minutes patient will be able to walk   Performance Level 5-10 minutes   Rationale for safe household ambulation;for safe outdoor household ambulation;for safe community ambulation;for safe work place ambulation;to maintain proper body mechanics/posture while ambulating to avoid additional compensatory injury due to improper gait mechanics;to promote a healthy and active lifestyle   Due Date 07/10/22    LTG Target Performance Minutes patient will be able to  walk   Performance Level 20-30 minutes   Rationale for safe household ambulation;for safe outdoor household ambulation;for safe community ambulation;for safe work place ambulation;to maintain proper body mechanics/posture while ambulating to avoid additional compensatory injury due to improper gait mechanics;to promote a healthy and active lifestyle   Due Date 08/10/22       Therapy Frequency:  2x/week for 2 weeks progressing to 1x/week for 8 weeks  Predicted Duration of Therapy Intervention:       Jeyson Madera, PT                  I CERTIFY THE NEED FOR THESE SERVICES FURNISHED UNDER        THIS PLAN OF TREATMENT AND WHILE UNDER MY CARE     (Physician attestation of this document indicates review and certification of the therapy plan).                     Certification Date From:  06/10/22   Certification Date To:  08/19/22    Referring Provider:  Ralf Robertson    Initial Assessment        See Epic Evaluation SOC Date: 06/10/22

## 2022-06-15 ENCOUNTER — OFFICE VISIT (OUTPATIENT)
Dept: ORTHOPEDICS | Facility: CLINIC | Age: 32
End: 2022-06-15
Payer: COMMERCIAL

## 2022-06-15 DIAGNOSIS — M25.562 CHRONIC PAIN OF LEFT KNEE: Primary | ICD-10-CM

## 2022-06-15 DIAGNOSIS — G89.29 CHRONIC PAIN OF LEFT KNEE: Primary | ICD-10-CM

## 2022-06-15 PROCEDURE — 99024 POSTOP FOLLOW-UP VISIT: CPT

## 2022-06-15 NOTE — NURSING NOTE
Reason For Visit:   Chief Complaint   Patient presents with     Surgical Followup     1 week POP Left knee scope, Examination under anesthesia left knee, and Chondroplasty medial femoral condyle DOS 6/7/22 with Dr. Robertson       There were no vitals taken for this visit.    Pain Assessment  Patient Currently in Pain: Yes  0-10 Pain Scale: 5  Primary Pain Location: Knee (Left)  Pain Descriptors: Tightness, Other (comment) (popping)  Alleviating Factors: Pain medication (oxycodone)  Aggravating Factors: Movement    Kelli Solis ATC

## 2022-06-15 NOTE — PROGRESS NOTES
Chief Complaint:   1. 1 week follow up     Procedures:  1. Examination under anesthesia left knee  2. Left knee arthroscopy  3. Chondroplasty medial femoral condyle  DOS: 6/7/22    History:  Misha Buckner is a 33 yo male 1 week s/p above procedure. He is doing well overall. Pain is well controlled during the day with Tylenol, still taking one 5 mg oxycodone at night. Has had one visit with PT, has follow up visits scheduled. Endorses 4/10 pain today, has noticed L knee swelling. Denies erythema or wound drainage.     Exam:     General: Awake, Alert, and oriented. Articulates and communicates with a normal affect     Left Lower Extremity:    Incisions well healed without evidence of infection    Normal post-operative effusion and ecchymosis    Range of motion and stability exam not performed    Neurovascularly intact    Gait: Antalgic with use of crutches     Imaging:  No new imaging     Medications:     Current Outpatient Medications:      acetaminophen (TYLENOL) 325 MG tablet, Take 2 tablets (650 mg) by mouth every 4 hours as needed for mild pain, Disp: 50 tablet, Rfl: 1     amLODIPine (NORVASC) 5 MG tablet, Take 10 mg by mouth daily, Disp: , Rfl:      oxyCODONE (ROXICODONE) 5 MG tablet, Take 1-2 tablets (5-10 mg) by mouth every 4 hours as needed for moderate to severe pain, Disp: 10 tablet, Rfl: 0     senna-docusate (SENOKOT-S/PERICOLACE) 8.6-50 MG tablet, Take 1-2 tablets by mouth 2 times daily, Disp: 30 tablet, Rfl: 0    Assesment:  1. Doing well 1 week s/p L knee arthroscopy, chondroplasty medial femoral condyle. Basic wound cares performed today including nylon suture removal.     Plan:   -Continue work with physical therapy   - Weight bearing as tolerated, ok to wean from crutches when able   -Range of motion as tolerated   -Follow up with Dr. Robertson in 6 weeks, sooner if needed    Wen Olson PA-C 6/15/2022 2:53 PM  Orthopedic Surgery

## 2022-06-15 NOTE — LETTER
6/15/2022         RE: Misha Buckner  [address] [place] [facility] [address]        Dear Colleague,    Thank you for referring your patient, Misha Buckner, to the [facility] [place]. Please see a copy of my visit note below.    Chief Complaint:   1. 1 week follow up     Procedures:  1. Examination under anesthesia left knee  2. Left knee arthroscopy  3. Chondroplasty medial femoral condyle  DOS: 6/7/22    History:  Misha Buckner is a 33 yo male 1 week s/p above procedure. He is doing well overall. Pain is well controlled during the day with Tylenol, still taking one 5 mg oxycodone at night. Has had one visit with PT, has follow up visits scheduled. Endorses 4/10 pain today, has noticed L knee swelling. Denies erythema or wound drainage.     Exam:     General: Awake, Alert, and oriented. Articulates and communicates with a normal affect     Left Lower Extremity:    Incisions well healed without evidence of infection    Normal post-operative effusion and ecchymosis    Range of motion and stability exam not performed    Neurovascularly intact    Gait: Antalgic with use of crutches     Imaging:  No new imaging     Medications:     Current Outpatient Medications:      acetaminophen (TYLENOL) 325 MG tablet, Take 2 tablets (650 mg) by mouth every 4 hours as needed for mild pain, Disp: 50 tablet, Rfl: 1     amLODIPine (NORVASC) 5 MG tablet, Take 10 mg by mouth daily, Disp: , Rfl:      oxyCODONE (ROXICODONE) 5 MG tablet, Take 1-2 tablets (5-10 mg) by mouth every 4 hours as needed for moderate to severe pain, Disp: 10 tablet, Rfl: 0     senna-docusate (SENOKOT-S/PERICOLACE) 8.6-50 MG tablet, Take 1-2 tablets by mouth 2 times daily, Disp: 30 tablet, Rfl: 0    Assesment:  1. Doing well 1 week s/p L knee arthroscopy, chondroplasty medial femoral condyle. Basic wound cares performed today including nylon suture removal.     Plan:   -Continue work with physical therapy   - Weight  bearing as tolerated, ok to wean from crutches when able   -Range of motion as tolerated   -Follow up with Dr. Robertson in 6 weeks, sooner if needed    Wen Olson PA-C 6/15/2022 2:53 PM  Orthopedic Surgery

## 2022-06-17 ENCOUNTER — THERAPY VISIT (OUTPATIENT)
Dept: PHYSICAL THERAPY | Facility: CLINIC | Age: 32
End: 2022-06-17
Payer: COMMERCIAL

## 2022-06-17 DIAGNOSIS — M25.562 CHRONIC PAIN OF LEFT KNEE: Primary | ICD-10-CM

## 2022-06-17 DIAGNOSIS — Z98.890 S/P ARTHROSCOPIC KNEE SURGERY: ICD-10-CM

## 2022-06-17 DIAGNOSIS — G89.29 CHRONIC PAIN OF LEFT KNEE: Primary | ICD-10-CM

## 2022-06-17 PROCEDURE — 97140 MANUAL THERAPY 1/> REGIONS: CPT | Mod: GP

## 2022-06-17 PROCEDURE — 97112 NEUROMUSCULAR REEDUCATION: CPT | Mod: GP

## 2022-06-17 PROCEDURE — 97110 THERAPEUTIC EXERCISES: CPT | Mod: GP

## 2022-06-17 PROCEDURE — 97016 VASOPNEUMATIC DEVICE THERAPY: CPT | Mod: GP

## 2022-06-21 ENCOUNTER — THERAPY VISIT (OUTPATIENT)
Dept: PHYSICAL THERAPY | Facility: CLINIC | Age: 32
End: 2022-06-21
Payer: COMMERCIAL

## 2022-06-21 DIAGNOSIS — Z98.890 S/P ARTHROSCOPIC KNEE SURGERY: Primary | ICD-10-CM

## 2022-06-21 PROCEDURE — 97140 MANUAL THERAPY 1/> REGIONS: CPT | Mod: GP

## 2022-06-21 PROCEDURE — 97016 VASOPNEUMATIC DEVICE THERAPY: CPT | Mod: GP

## 2022-06-21 PROCEDURE — 97110 THERAPEUTIC EXERCISES: CPT | Mod: GP

## 2022-06-26 ENCOUNTER — HEALTH MAINTENANCE LETTER (OUTPATIENT)
Age: 32
End: 2022-06-26

## 2022-06-28 ENCOUNTER — MYC REFILL (OUTPATIENT)
Dept: SURGERY | Facility: AMBULATORY SURGERY CENTER | Age: 32
End: 2022-06-28

## 2022-06-28 DIAGNOSIS — G89.29 CHRONIC PAIN OF LEFT KNEE: ICD-10-CM

## 2022-06-28 DIAGNOSIS — M25.562 CHRONIC PAIN OF LEFT KNEE: ICD-10-CM

## 2022-06-30 ENCOUNTER — THERAPY VISIT (OUTPATIENT)
Dept: PHYSICAL THERAPY | Facility: CLINIC | Age: 32
End: 2022-06-30
Payer: COMMERCIAL

## 2022-06-30 DIAGNOSIS — Z98.890 S/P ARTHROSCOPIC KNEE SURGERY: Primary | ICD-10-CM

## 2022-06-30 PROCEDURE — 97110 THERAPEUTIC EXERCISES: CPT | Mod: GP

## 2022-06-30 PROCEDURE — 97016 VASOPNEUMATIC DEVICE THERAPY: CPT | Mod: GP

## 2022-06-30 PROCEDURE — 97140 MANUAL THERAPY 1/> REGIONS: CPT | Mod: GP

## 2022-07-01 RX ORDER — OXYCODONE HYDROCHLORIDE 5 MG/1
5-10 TABLET ORAL EVERY 4 HOURS PRN
Qty: 10 TABLET | Refills: 0 | OUTPATIENT
Start: 2022-07-01

## 2022-07-01 NOTE — TELEPHONE ENCOUNTER
Discussed with Dr. Robertson, he is not approved for a refill.   Called patient, no answer and no voicemail set up. Will wait for patient to call back if medication is needed.  Soco Ugalde RN

## 2022-07-08 ENCOUNTER — THERAPY VISIT (OUTPATIENT)
Dept: PHYSICAL THERAPY | Facility: CLINIC | Age: 32
End: 2022-07-08
Payer: COMMERCIAL

## 2022-07-08 DIAGNOSIS — Z98.890 S/P ARTHROSCOPIC KNEE SURGERY: Primary | ICD-10-CM

## 2022-07-08 PROCEDURE — 97140 MANUAL THERAPY 1/> REGIONS: CPT | Mod: GP

## 2022-07-08 PROCEDURE — 97110 THERAPEUTIC EXERCISES: CPT | Mod: GP

## 2022-07-08 PROCEDURE — 97016 VASOPNEUMATIC DEVICE THERAPY: CPT | Mod: GP

## 2022-07-15 ENCOUNTER — THERAPY VISIT (OUTPATIENT)
Dept: PHYSICAL THERAPY | Facility: CLINIC | Age: 32
End: 2022-07-15
Payer: COMMERCIAL

## 2022-07-15 DIAGNOSIS — Z98.890 S/P ARTHROSCOPIC KNEE SURGERY: Primary | ICD-10-CM

## 2022-07-15 PROCEDURE — 97112 NEUROMUSCULAR REEDUCATION: CPT | Mod: GP

## 2022-07-15 PROCEDURE — 97140 MANUAL THERAPY 1/> REGIONS: CPT | Mod: GP

## 2022-07-15 PROCEDURE — 97110 THERAPEUTIC EXERCISES: CPT | Mod: GP

## 2022-07-15 NOTE — PROGRESS NOTES
PROGRESS  REPORT    Progress reporting period is from 6/10/22 to 7/15/22.       SUBJECTIVE  Subjective changes noted by patient: Knee is feeling much better, swelling and pain have improved. Walking without crutches, stairs one step at a time.  Current pain level is 3/10  .     Changes in function:  Yes (See Goal flowsheet attached for changes in current functional level)  Adverse reaction to treatment or activity: None    OBJECTIVE  Changes noted in objective findings:  Yes  Objective: Knee ROM 3-0-105.   Able to perform supine SLR x20 reps before fatigue.   SL balance 20 seconds left.   Swelling in suprapatellar region.     ASSESSMENT/PLAN  Updated problem list and treatment plan: Diagnosis 1: S/p L knee arthroscopy, chondroplasty debriedement  Pain -  hot/cold therapy, electric stimulation, splint/taping/bracing/orthotics, self management, education and home program  Decreased ROM/flexibility - manual therapy, therapeutic exercise and home program  Decreased joint mobility - manual therapy, therapeutic exercise and home program  Decreased strength - therapeutic exercise, therapeutic activities and home program  Edema - vasopneumatics and self management/home program  Impaired gait - gait training and home program  Impaired muscle performance - electric stimulation, neuro re-education and home program  Decreased function - therapeutic activities and home program     STG/LTGs have been met or progress has been made towards goals:  Yes (See Goal flow sheet completed today.)  Assessment of Progress: The patient's condition is improving.  Patient is meeting short term goals and is progressing towards long term goals.  Self Management Plans:  Patient has been instructed in a home treatment program.  Patient  has been instructed in self management of symptoms.  I have re-evaluated this patient and find that the nature, scope, duration and intensity of the therapy is appropriate for the medical condition of the  patientMango Cabral continues to require the following intervention to meet STG and LTG's:  PT    Recommendations:  This patient would benefit from continued therapy.     Frequency:  1 X week, once daily  Duration:  for 3 weeks tapering to 2 X a month over 2 months        Please refer to the daily flowsheet for treatment today, total treatment time and time spent performing 1:1 timed codes.

## 2022-07-25 ENCOUNTER — OFFICE VISIT (OUTPATIENT)
Dept: ORTHOPEDICS | Facility: CLINIC | Age: 32
End: 2022-07-25
Payer: COMMERCIAL

## 2022-07-25 VITALS — WEIGHT: 235 LBS | BODY MASS INDEX: 32.9 KG/M2 | HEIGHT: 71 IN

## 2022-07-25 DIAGNOSIS — M25.562 CHRONIC PAIN OF LEFT KNEE: Primary | ICD-10-CM

## 2022-07-25 DIAGNOSIS — G89.29 CHRONIC PAIN OF LEFT KNEE: Primary | ICD-10-CM

## 2022-07-25 PROCEDURE — 99024 POSTOP FOLLOW-UP VISIT: CPT | Mod: GC | Performed by: ORTHOPAEDIC SURGERY

## 2022-07-25 NOTE — LETTER
7/25/2022         RE: Misha Buckner  [address] [facility] [address]        Dear Colleague,    Thank you for referring your patient, Misha Buckner, to the [facility] [place]. Please see a copy of my visit note below.    Diagnosis:   1. Medial femoral condyle chondral defect 2 x 2 cm , grade 3-4 change left knee  2. Intact medial meniscus, intact medial tibial cartilage.  Normal lateral compartment and patellofemoral compartment    Procedures:  3. Examination under anesthesia left knee  4. Left knee arthroscopy  5. Chondroplasty medial femoral condyle  DOS: 6/7/22    History:  Misha Buckner is a 33 yo male 6 week s/p above procedure. Patient feels that he is continuing to have left knee pain. His swelling has gone down some, but he started having painful popping in the knee again after his ROM improved. He feels that this is now more frequent than prior to surgery. He does not have much pain when the knee is not popping. Experiences painful popping 15-20x/day. He is working with PT 1x/week and doing daily HEP. He is taking Tylenol 3 usually once daily, occasionally during the day as well.    Exam:     General: Awake, Alert, and oriented. Articulates and communicates with a normal affect     Left Lower Extremity:    Incisions well healed without evidence of infection    Moderate effusion     Range of motion 3-120, no crepitus with ROM    No TTP medial and lateral joint lines    Neurovascularly intact    Imaging:  No new imaging     Assessment:  1. 6 week s/p L knee arthroscopy, chondroplasty medial femoral condyle.     Plan:   -Discussed that if the patient fails to see lasting improvement I would consider osteochondral allograft transplantation of the medial femoral condyle plus or minus proximal tibial osteotomy as determined by standing 6 foot alignment films.   -Will plan to obtain alignment films today, our team will call with results  -Discussed that it would  be reasonable to wait and see how he progresses with therapy and time before deciding whether to proceed with surgery, patient is interested in waiting to see how his symptoms evolve as well  -Follow up in 3-4 weeks over telephone visit to check progress and to see if we want to move forward with surgery    Seen and discussed with Dr. Robertson.    Geena Oconnell MD  Orthopedic Surgery PGY5            Patient seen and examined with the resident.     Assesment: 6 weeks status post arthroscopy chondroplasty of the medial femoral condyle left knee.    Plan: A long discussion with the patient.  He certainly still has, some continued symptoms.  I did discuss with him that he does have a cartilage defect and that going forward he is a candidate for further surgery.  The surgical plan would be osteochondral allograft transplantation plus or minus proximal tibial osteotomy.I did tell the patient that I wanted to get alignment films today so we could come to a decision on whether this to be necessary or not.  After discussing with the patient regarding possible surgical options he would like to continue to think things over for the next 4 to 6 weeks and I think this is reasonable.  He will plan to see me again in approximately 6 weeks to continue the discussion.  He understands that there is no rush or obligation we definitively want to exhaust all nonsurgical interventions first.    Addendum: Standing alignment films today do demonstrate the weightbearing axis falling medial to the medial tibial spine.  In light of this information and given the threatened area it is my thought that he would benefit from a small osteotomy to unload the medial compartment at the time of his osteochondral allograft transplantation.    I agree with history, physical and imaging as well as the assessment and plan as detailed by Dr. Oconnell.         Again, thank you for allowing me to participate in the care of your patient.         Sincerely,        Ralf Robertson MD

## 2022-07-25 NOTE — PROGRESS NOTES
Patient seen and examined with the resident.     Assesment: 6 weeks status post arthroscopy chondroplasty of the medial femoral condyle left knee.    Plan: A long discussion with the patient.  He certainly still has, some continued symptoms.  I did discuss with him that he does have a cartilage defect and that going forward he is a candidate for further surgery.  The surgical plan would be osteochondral allograft transplantation plus or minus proximal tibial osteotomy.I did tell the patient that I wanted to get alignment films today so we could come to a decision on whether this to be necessary or not.  After discussing with the patient regarding possible surgical options he would like to continue to think things over for the next 4 to 6 weeks and I think this is reasonable.  He will plan to see me again in approximately 6 weeks to continue the discussion.  He understands that there is no rush or obligation we definitively want to exhaust all nonsurgical interventions first.    Addendum: Standing alignment films today do demonstrate the weightbearing axis falling medial to the medial tibial spine.  In light of this information and given the threatened area it is my thought that he would benefit from a small osteotomy to unload the medial compartment at the time of his osteochondral allograft transplantation.    I agree with history, physical and imaging as well as the assessment and plan as detailed by Dr. Oconnell.

## 2022-07-25 NOTE — PROGRESS NOTES
Diagnosis:   1. Medial femoral condyle chondral defect 2 x 2 cm , grade 3-4 change left knee  2. Intact medial meniscus, intact medial tibial cartilage.  Normal lateral compartment and patellofemoral compartment    Procedures:  3. Examination under anesthesia left knee  4. Left knee arthroscopy  5. Chondroplasty medial femoral condyle  DOS: 6/7/22    History:  Misha Buckner is a 33 yo male 6 week s/p above procedure. Patient feels that he is continuing to have left knee pain. His swelling has gone down some, but he started having painful popping in the knee again after his ROM improved. He feels that this is now more frequent than prior to surgery. He does not have much pain when the knee is not popping. Experiences painful popping 15-20x/day. He is working with PT 1x/week and doing daily HEP. He is taking Tylenol 3 usually once daily, occasionally during the day as well.    Exam:     General: Awake, Alert, and oriented. Articulates and communicates with a normal affect     Left Lower Extremity:    Incisions well healed without evidence of infection    Moderate effusion     Range of motion 3-120, no crepitus with ROM    No TTP medial and lateral joint lines    Neurovascularly intact    Imaging:  No new imaging     Assessment:  1. 6 week s/p L knee arthroscopy, chondroplasty medial femoral condyle.     Plan:   -Discussed that if the patient fails to see lasting improvement I would consider osteochondral allograft transplantation of the medial femoral condyle plus or minus proximal tibial osteotomy as determined by standing 6 foot alignment films.   -Will plan to obtain alignment films today, our team will call with results  -Discussed that it would be reasonable to wait and see how he progresses with therapy and time before deciding whether to proceed with surgery, patient is interested in waiting to see how his symptoms evolve as well  -Follow up in 3-4 weeks over telephone visit to check progress and to see if we  want to move forward with surgery    Seen and discussed with Dr. Robertson.    Geena Oconnell MD  Orthopedic Surgery PGY5

## 2022-07-25 NOTE — NURSING NOTE
"Reason For Visit:   Chief Complaint   Patient presents with     Left Knee - RECHECK     DOS 6/7/2022 left knee arthroscopy, chondroplasty, debridement and staging for future cartilage surgery.      Date of surgery: 6/7/2022  Type of surgery:   PROCEDURE:  1. Examination under anesthesia left knee  2. Left knee arthroscopy  3. Chondroplasty medial femoral condyle    He has had continued pain in the knee since surgery with walking and sitting. The majority of his pain in on the medial knee. He also reports painful popping in the knee several times a day.     SANE Score  Left Knee: 60  Right Knee: 95       Ht 1.803 m (5' 11\")   Wt 106.6 kg (235 lb)   BMI 32.78 kg/m         No Known Allergies    Current Outpatient Medications   Medication     acetaminophen (TYLENOL) 325 MG tablet     amLODIPine (NORVASC) 5 MG tablet     oxyCODONE (ROXICODONE) 5 MG tablet     senna-docusate (SENOKOT-S/PERICOLACE) 8.6-50 MG tablet     No current facility-administered medications for this visit.         Shani Adamson, ATC    "

## 2022-07-29 ENCOUNTER — THERAPY VISIT (OUTPATIENT)
Dept: PHYSICAL THERAPY | Facility: CLINIC | Age: 32
End: 2022-07-29
Payer: COMMERCIAL

## 2022-07-29 DIAGNOSIS — Z98.890 S/P ARTHROSCOPIC KNEE SURGERY: Primary | ICD-10-CM

## 2022-07-29 PROCEDURE — 97530 THERAPEUTIC ACTIVITIES: CPT | Mod: GP

## 2022-07-29 PROCEDURE — 97016 VASOPNEUMATIC DEVICE THERAPY: CPT | Mod: GP

## 2022-07-29 PROCEDURE — 97110 THERAPEUTIC EXERCISES: CPT | Mod: GP

## 2022-08-05 ENCOUNTER — THERAPY VISIT (OUTPATIENT)
Dept: PHYSICAL THERAPY | Facility: CLINIC | Age: 32
End: 2022-08-05
Payer: COMMERCIAL

## 2022-08-05 DIAGNOSIS — Z98.890 S/P ARTHROSCOPIC KNEE SURGERY: Primary | ICD-10-CM

## 2022-08-05 PROCEDURE — 97110 THERAPEUTIC EXERCISES: CPT | Mod: GP

## 2022-08-05 PROCEDURE — 97530 THERAPEUTIC ACTIVITIES: CPT | Mod: GP

## 2022-08-19 ENCOUNTER — THERAPY VISIT (OUTPATIENT)
Dept: PHYSICAL THERAPY | Facility: CLINIC | Age: 32
End: 2022-08-19
Payer: COMMERCIAL

## 2022-08-19 DIAGNOSIS — M25.562 CHRONIC PAIN OF LEFT KNEE: ICD-10-CM

## 2022-08-19 DIAGNOSIS — Z98.890 S/P ARTHROSCOPIC KNEE SURGERY: Primary | ICD-10-CM

## 2022-08-19 DIAGNOSIS — G89.29 CHRONIC PAIN OF LEFT KNEE: ICD-10-CM

## 2022-08-19 PROCEDURE — 97110 THERAPEUTIC EXERCISES: CPT | Mod: 59

## 2022-08-19 PROCEDURE — 97530 THERAPEUTIC ACTIVITIES: CPT | Mod: GP

## 2022-08-19 NOTE — PROGRESS NOTES
PROGRESS  REPORT    Progress reporting period is from 7/15/22 to 8/19/22.       SUBJECTIVE  Having less popping but more shooting pain in the front of the knee. Shooting pain is 8/10 with weightbearing activities. Tape was very helpful but skin got irritated. Knee feels similar to pain prior to surgery.   Current pain level is 3/10  .     Changes in function:  Yes (See Goal flowsheet attached for changes in current functional level)  Adverse reaction to treatment or activity: None    OBJECTIVE  Knee ROM 5-0 120  Good quad set and SLR  Squat to 80 degrees before pain  Unable to perform SL squat without pain  Trace swelling  SL balance 30 seconds    ASSESSMENT/PLAN  Updated problem list and treatment plan: Diagnosis 1: S/p L knee arthroscopy, chondroplasty debriedement  Pain -  hot/cold therapy, electric stimulation, splint/taping/bracing/orthotics, self management, education and home program  Decreased ROM/flexibility - manual therapy, therapeutic exercise and home program  Decreased joint mobility - manual therapy, therapeutic exercise and home program  Decreased strength - therapeutic exercise, therapeutic activities and home program  Edema - vasopneumatics and self management/home program  Impaired gait - gait training and home program  Impaired muscle performance - electric stimulation, neuro re-education and home program  Decreased function - therapeutic activities and home program     STG/LTGs have been met or progress has been made towards goals:  Yes (See Goal flow sheet completed today.)  Assessment of Progress: The patient's condition is improving.  Patient is meeting short term goals and is progressing towards long term goals.  Self Management Plans:  Patient has been instructed in a home treatment program.  Patient  has been instructed in self management of symptoms.  I have re-evaluated this patient and find that the nature, scope, duration and intensity of the therapy is appropriate for the medical  condition of the patient.  Misha continues to require the following intervention to meet STG and LTG's:  PT     Recommendations:  This patient would benefit from continued therapy.     Frequency:  2 X month, once daily  Duration:  for 2 months           Please refer to the daily flowsheet for treatment today, total treatment time and time spent performing 1:1 timed codes.

## 2022-08-22 ENCOUNTER — VIRTUAL VISIT (OUTPATIENT)
Dept: ORTHOPEDICS | Facility: CLINIC | Age: 32
End: 2022-08-22
Payer: COMMERCIAL

## 2022-08-22 DIAGNOSIS — M25.562 CHRONIC PAIN OF LEFT KNEE: Primary | ICD-10-CM

## 2022-08-22 DIAGNOSIS — G89.29 CHRONIC PAIN OF LEFT KNEE: Primary | ICD-10-CM

## 2022-08-22 PROCEDURE — 99441 PR PHYSICIAN TELEPHONE EVALUATION 5-10 MIN: CPT | Performed by: ORTHOPAEDIC SURGERY

## 2022-08-22 NOTE — NURSING NOTE
Reason For Visit:   Chief Complaint   Patient presents with     Surgical Followup     Left knee f/u, XR completed 7/25          Date of surgery: 6/7/2022  Type of surgery:   PROCEDURE:  1. Examination under anesthesia left knee  2. Left knee arthroscopy  3. Chondroplasty medial femoral condyle       SANE Score  Left Knee:40-60  Right Knee:90-95    Pain Assessment  Patient Currently in Pain: Yes  0-10 Pain Scale: 3  Primary Pain Location: Knee (left)  Pain Descriptors: Tightness, Sore  Alleviating Factors: Ice, Rest, Other (comment) (elevating)  Aggravating Factors: Other (comment) (full pressure)    There were no vitals taken for this visit.       No Known Allergies    Current Outpatient Medications   Medication     acetaminophen (TYLENOL) 325 MG tablet     amLODIPine (NORVASC) 5 MG tablet     oxyCODONE (ROXICODONE) 5 MG tablet     senna-docusate (SENOKOT-S/PERICOLACE) 8.6-50 MG tablet     No current facility-administered medications for this visit.         Kelli Solis, ATC

## 2022-08-22 NOTE — LETTER
8/22/2022         RE: Misha Buckner  [address] [facility] [address]        Dear Colleague,    Thank you for referring your patient, Misha Buckner, to the [facility] [place]. Please see a copy of my visit note below.      Misha is a 32 year old who is being evaluated via a billable telephone visit.      Diagnosis:   1. Medial femoral condyle chondral defect 2 x 2 cm , grade 3-4 change left knee  2. Intact medial meniscus, intact medial tibial cartilage.  Normal lateral compartment and patellofemoral compartment    Procedures:  3. Examination under anesthesia left knee  4. Left knee arthroscopy  5. Chondroplasty medial femoral condyle  DOS: 6/7/22    History:  The patient returns to my clinic today for interval follow-up visit.  At the conclusion of her last clinic visit we obtained standing alignment films.  He tells me also at that time that he knows he has a full-thickness cartilage defect he wanted to get a little bit more time to think things over.  He tells me he is now interested in proceeding with surgery.  He denies any intercurrent trauma and/or changes in his history    Exam:       No examination was completed today as this was a telephone visit    Imaging:  Standing 6 foot alignment films obtained since her last visit independently reviewed by myself which does show trace varus malalignment to the left lower extremity    Assessment:  1. Osteochondral defect medial femoral condyle left knee  2. Varus malalignment left knee    Plan:   I had a long discussion with the patient.  Reviewed the diagnosis potential treatment options.  He states that he is sufficiently symptomatic and like to consider surgical intervention    The surgical plan will be examination under anesthesia left knee, osteochondral allograft transplantation medial femoral condyle left knee, proximal tibial osteotomy.  I discussed with the patient the pros cons risk and benefits of surgery.   We discussed the expected course of recovery.  He understands that he would not feel put weight on it for the first 6 weeks.  He understands that he has to use a knee brace following surgery for the first 6 weeks.  He understands the risk such as failure to improve, continued symptoms or subsequent knee arthritis.  He understands that there are limitations of this large cartilage reconstruction that he may have to stay in the hospital 1 night.    After review of this we discussed the pros cons risk and benefits he desired to proceed.  We will look for time to schedule and this can be completed.    What phone number would you like to be contacted at?   How would you like to obtain your AVS? MyChart  Phone call duration: 10 minutes; total time 15 minutes        Again, thank you for allowing me to participate in the care of your patient.        Sincerely,        Ralf Robertson MD

## 2022-08-22 NOTE — LETTER
Date:August 23, 2022      Patient was self referred, no letter generated. Do not send.        [facility] Information

## 2022-08-22 NOTE — PROGRESS NOTES
Misha is a 32 year old who is being evaluated via a billable telephone visit.      Diagnosis:   1. Medial femoral condyle chondral defect 2 x 2 cm , grade 3-4 change left knee  2. Intact medial meniscus, intact medial tibial cartilage.  Normal lateral compartment and patellofemoral compartment    Procedures:  3. Examination under anesthesia left knee  4. Left knee arthroscopy  5. Chondroplasty medial femoral condyle  DOS: 6/7/22    History:  The patient returns to my clinic today for interval follow-up visit.  At the conclusion of her last clinic visit we obtained standing alignment films.  He tells me also at that time that he knows he has a full-thickness cartilage defect he wanted to get a little bit more time to think things over.  He tells me he is now interested in proceeding with surgery.  He denies any intercurrent trauma and/or changes in his history    Exam:       No examination was completed today as this was a telephone visit    Imaging:  Standing 6 foot alignment films obtained since her last visit independently reviewed by myself which does show trace varus malalignment to the left lower extremity    Assessment:  1. Osteochondral defect medial femoral condyle left knee  2. Varus malalignment left knee    Plan:   I had a long discussion with the patient.  Reviewed the diagnosis potential treatment options.  He states that he is sufficiently symptomatic and like to consider surgical intervention    The surgical plan will be examination under anesthesia left knee, osteochondral allograft transplantation medial femoral condyle left knee, proximal tibial osteotomy.  I discussed with the patient the pros cons risk and benefits of surgery.  We discussed the expected course of recovery.  He understands that he would not feel put weight on it for the first 6 weeks.  He understands that he has to use a knee brace following surgery for the first 6 weeks.  He understands the risk such as failure to improve,  continued symptoms or subsequent knee arthritis.  He understands that there are limitations of this large cartilage reconstruction that he may have to stay in the hospital 1 night.    After review of this we discussed the pros cons risk and benefits he desired to proceed.  We will look for time to schedule and this can be completed.    What phone number would you like to be contacted at?   How would you like to obtain your AVS? Shannon  Phone call duration: 10 minutes; total time 15 minutes

## 2022-09-06 ENCOUNTER — THERAPY VISIT (OUTPATIENT)
Dept: PHYSICAL THERAPY | Facility: CLINIC | Age: 32
End: 2022-09-06
Payer: COMMERCIAL

## 2022-09-06 DIAGNOSIS — M25.562 CHRONIC PAIN OF LEFT KNEE: ICD-10-CM

## 2022-09-06 DIAGNOSIS — Z98.890 S/P ARTHROSCOPIC KNEE SURGERY: Primary | ICD-10-CM

## 2022-09-06 DIAGNOSIS — G89.29 CHRONIC PAIN OF LEFT KNEE: ICD-10-CM

## 2022-09-06 PROCEDURE — 97110 THERAPEUTIC EXERCISES: CPT | Mod: GP

## 2022-09-06 PROCEDURE — 97016 VASOPNEUMATIC DEVICE THERAPY: CPT | Mod: GP

## 2022-09-06 NOTE — PROGRESS NOTES
PROGRESS  REPORT    Progress reporting period is from 7/15/22 to 9/6/22.       SUBJECTIVE  Subjective changes noted by patient:  Having a lot of pain in knee.  Not able to complete weightbearing activities without pain. Planning to have surgery on 10/11/22  Changes in function:  Yes (See Goal flowsheet attached for changes in current functional level)  Adverse reaction to treatment or activity: None    OBJECTIVE  Changes noted in objective findings:  Yes  Objective: Knee ROM 5-0-110. Good quad set. Pain with DL squats.     ASSESSMENT/PLAN  Updated problem list and treatment plan: Diagnosis 1: S/p L knee arthroscopy, chondroplasty debriedement  Pain -  hot/cold therapy, electric stimulation, splint/taping/bracing/orthotics, self management, education and home program  Decreased ROM/flexibility - manual therapy, therapeutic exercise and home program  Decreased joint mobility - manual therapy, therapeutic exercise and home program  Decreased strength - therapeutic exercise, therapeutic activities and home program  Edema - vasopneumatics and self management/home program  Impaired gait - gait training and home program  Impaired muscle performance - electric stimulation, neuro re-education and home program  Decreased function - therapeutic activities and home program  STG/LTGs have been met or progress has been made towards goals:  Yes (See Goal flow sheet completed today.)  Assessment of Progress: The patient's condition has exacerbated.  Self Management Plans:  Patient has been instructed in a home treatment program.  Patient  has been instructed in self management of symptoms.  I have re-evaluated this patient and find that the nature, scope, duration and intensity of the therapy is appropriate for the medical condition of the patient.  Misha continues to require the following intervention to meet STG and LTG's:  PT intervention is no longer required to meet STG/LTG.    Recommendations:  This patient is ready to be  discharged from therapy and continue their home treatment program.     Please refer to the daily flowsheet for treatment today, total treatment time and time spent performing 1:1 timed codes.

## 2022-09-06 NOTE — PROGRESS NOTES
[facility]    OUTPATIENT Physical Therapy ORTHOPEDIC EVALUATION  PLAN OF TREATMENT FOR OUTPATIENT REHABILITATION  (COMPLETE FOR INITIAL CLAIMS ONLY)  Patient's Last Name, First Name, M.I.  YOB: 1990  Misha Buckner    Provider s Name:  [facility]   Medical Record No.  [id number]   Start of Care Date:  06/10/22   Onset Date:  06/07/22    Type:     _X__PT   ___OT Medical Diagnosis:  No diagnosis found.     Treatment Diagnosis:  L knee arthroscopy, chondroplasty debridement        Goals:     09/06/22 0500   Body Part   Goals listed below are for L knee   Goal #1   Goal #1 ambulation   Previous Functional Level No restrictions   Current Functional Level Minutes patient can walk   Performance Level 3 min without crutches   STG Target Performance Minutes patient will be able to walk   Performance Level 5-10 minutes   Rationale for safe household ambulation;for safe outdoor household ambulation;for safe community ambulation;for safe work place ambulation;to maintain proper body mechanics/posture while ambulating to avoid additional compensatory injury due to improper gait mechanics;to promote a healthy and active lifestyle   Due Date 07/10/22   Date Goal Met 07/15/22    LTG Target Performance Minutes patient will be able to  walk   Performance Level 20-30 minutes   Rationale for safe household ambulation;for safe outdoor household ambulation;for safe community ambulation;for safe work place ambulation;to maintain proper body mechanics/posture while ambulating to avoid additional compensatory injury due to improper gait mechanics;to promote a healthy and active lifestyle   Due Date 08/10/22   Date Goal Met 08/19/22   Goal #2   Goal #2 stairs   Current Functional Level Ascend/descend stairs;one step at a time   STG Target Performance Ascend stairs;in a normal reciprocal  pattern   Rationale to reach lower level of home safely;for safe community ambulaton;for safe community access to buildings;to reach upper level of home safely;to enter/leave the house safely   Due Date 07/29/22   Date Goal Met 07/29/22   LTG Target Performance Ascend/descend stairs;in a normal reciprocal pattern   Rationale to enter/leave the house safely;to reach upper level of home safely;to reach lower level of home safely;for safe community access to buildings;for safe community ambulaton   Due Date 10/06/22       Therapy Frequency:  2x/week for 2 weeks progressing to 1x/week for 8 weeks  Predicted Duration of Therapy Intervention:       Jeyson Madera, PT                 I CERTIFY THE NEED FOR THESE SERVICES FURNISHED UNDER        THIS PLAN OF TREATMENT AND WHILE UNDER MY CARE     (Physician attestation of this document indicates review and certification of the therapy plan).                     Certification Date From:  06/10/22   Certification Date To:  10/06/22    Referring Provider:  Ralf Goodrich*    Initial Assessment        See Epic Evaluation SOC Date: 06/10/22

## 2022-09-09 ENCOUNTER — TELEPHONE (OUTPATIENT)
Dept: ORTHOPEDICS | Facility: CLINIC | Age: 32
End: 2022-09-09

## 2022-09-09 NOTE — TELEPHONE ENCOUNTER
Tried to call patient for surgery teaching. No answer and voicemail not set up. Will send [employer].     Pre-Operative Teaching Flowsheet     Person(s) involved in teaching: Patient     Motivation Level:  Receptive (willing/able to accept information) and asks appropriate questions where applicable: Yes  Any cultural factors/Yazdanism beliefs that may influence understanding or compliance? No     Patient demonstrates understanding of the following:  Pre-operative planning, including the necessary appointments and preparation needed prior to surgery: Yes  Which situations necessitate calling provider and whom to contact: Yes  Pain management techniques pre and post op: Yes  How, and when, to access community resources: Yes    Who will stay/ with patient after surgery: Family  Who will drive patient to surgery: Family         Additional Teaching Concerns Addressed:   Post-operative living arrangements and necessary adaptations to living environment.     Instructional Materials Used/Given: Yes, pre-op packet given including forms for Your surgery day, medications to stop taking before surgery, preparing for surgery, Covid-19 testing, showering before surgery, Stop light tool introduced, Opioid pain medication guideline, pre-op physical form, and map  Patient expressed understanding of all forms given, questions were answered and will review in more detail at home.     Time spent with patient: 20 minutes.

## 2022-09-27 ENCOUNTER — LAB (OUTPATIENT)
Dept: LAB | Facility: CLINIC | Age: 32
End: 2022-09-27

## 2022-09-27 ENCOUNTER — OFFICE VISIT (OUTPATIENT)
Dept: FAMILY MEDICINE | Facility: CLINIC | Age: 32
End: 2022-09-27
Payer: COMMERCIAL

## 2022-09-27 VITALS
RESPIRATION RATE: 15 BRPM | TEMPERATURE: 97.9 F | DIASTOLIC BLOOD PRESSURE: 87 MMHG | SYSTOLIC BLOOD PRESSURE: 130 MMHG | BODY MASS INDEX: 33.31 KG/M2 | WEIGHT: 238.8 LBS | HEART RATE: 97 BPM | OXYGEN SATURATION: 97 %

## 2022-09-27 DIAGNOSIS — Z01.810 PRE-OPERATIVE CARDIOVASCULAR EXAMINATION: Primary | ICD-10-CM

## 2022-09-27 DIAGNOSIS — Z01.810 PRE-OPERATIVE CARDIOVASCULAR EXAMINATION: ICD-10-CM

## 2022-09-27 DIAGNOSIS — I10 BENIGN ESSENTIAL HYPERTENSION: ICD-10-CM

## 2022-09-27 LAB
ANION GAP SERPL CALCULATED.3IONS-SCNC: 11 MMOL/L (ref 7–15)
BUN SERPL-MCNC: 11.3 MG/DL (ref 6–20)
CALCIUM SERPL-MCNC: 10.6 MG/DL (ref 8.6–10)
CHLORIDE SERPL-SCNC: 104 MMOL/L (ref 98–107)
CREAT SERPL-MCNC: 1.08 MG/DL (ref 0.67–1.17)
DEPRECATED HCO3 PLAS-SCNC: 22 MMOL/L (ref 22–29)
GFR SERPL CREATININE-BSD FRML MDRD: >90 ML/MIN/1.73M2
GLUCOSE SERPL-MCNC: 104 MG/DL (ref 70–99)
HGB BLD-MCNC: 16.3 G/DL (ref 13.3–17.7)
POTASSIUM SERPL-SCNC: 4.6 MMOL/L (ref 3.4–5.3)
SODIUM SERPL-SCNC: 137 MMOL/L (ref 136–145)

## 2022-09-27 PROCEDURE — 99202 OFFICE O/P NEW SF 15 MIN: CPT | Performed by: NURSE PRACTITIONER

## 2022-09-27 PROCEDURE — 85018 HEMOGLOBIN: CPT | Performed by: PATHOLOGY

## 2022-09-27 PROCEDURE — 36415 COLL VENOUS BLD VENIPUNCTURE: CPT | Performed by: PATHOLOGY

## 2022-09-27 PROCEDURE — 80048 BASIC METABOLIC PNL TOTAL CA: CPT | Performed by: PATHOLOGY

## 2022-09-27 NOTE — PROGRESS NOTES
"[facility] [address] [address]  Phone: [phone]  Fax: [phone]  Primary Provider: No Ref-Primary, Physician  Pre-op Performing Provider: DEJA COREA      PREOPERATIVE EVALUATION:  Today's date: 9/27/2022    Misha Buckner is a 32 year old male who presents for a preoperative evaluation.    Surgical Information:  Surgery/Procedure: Examination under anesthesia left knee, open osteochondral allograft transplantation,  Proximal tibial osteotomy  Surgery Location: [facility] OR  Surgeon: Ralf Robertson MD  Surgery Date: 10/11/2022  Time of Surgery: 11:50 AM  Where patient plans to recover: At home with family  Fax number for surgical facility: Note does not need to be faxed, will be available electronically in Epic.    Type of Anesthesia Anticipated: Choice    Assessment & Plan     The proposed surgical procedure is considered INTERMEDIATE risk.    Pre-operative cardiovascular examination  - Hemoglobin  - Basic metabolic panel    Hypertension   - Basic metabolic panel         RECOMMENDATION:  APPROVAL GIVEN to proceed with proposed procedure, without further diagnostic evaluation.        15 minutes spent on the date of the encounter doing chart review, history and exam, documentation and further activities per the note    {    Subjective     HPI related to upcoming procedure:     Examination under anesthesia left knee, open osteochondral allograft transplantation,  Proximal tibial osteotomy    Patient had surgery 6/7/2022 -  \"PROCEDURE:  1. Examination under anesthesia left knee  2. Left knee arthroscopy  3. Chondroplasty medial femoral condyle\"    Continues with left knee pain, discussed with ortho, plans on follow-up knee surgery.        Preop Questions 9/27/2022   1. Have you ever had a heart attack or stroke? No   2. Have you ever had surgery on your heart or blood vessels, such as a stent placement, a " coronary artery bypass, or surgery on an artery in your head, neck, heart, or legs? No   3. Do you have chest pain with activity? No   4. Do you have a history of  heart failure? No   5. Do you currently have a cold, bronchitis or symptoms of other infection? No   6. Do you have a cough, shortness of breath, or wheezing? No   7. Do you or anyone in your family have previous history of blood clots? No   8. Do you or does anyone in your family have a serious bleeding problem such as prolonged bleeding following surgeries or cuts? No   9. Have you ever had problems with anemia or been told to take iron pills? No   10. Have you had any abnormal blood loss such as black, tarry or bloody stools? No   11. Have you ever had a blood transfusion? No   12. Are you willing to have a blood transfusion if it is medically needed before, during, or after your surgery? Yes   13. Have you or any of your relatives ever had problems with anesthesia? No   14. Do you have sleep apnea, excessive snoring or daytime drowsiness? No   15. Do you have any artifical heart valves or other implanted medical devices like a pacemaker, defibrillator, or continuous glucose monitor? No   16. Do you have artificial joints? No   17. Are you allergic to latex? No     Health Care Directive:  Patient does not have a Health Care Directive or Living Will: Discussed advance care planning with patient; however, patient declined at this time.    Preoperative Review of :   reviewed - controlled substances reflected in medication list.      Status of Chronic Conditions:  HYPERTENSION - Patient has longstanding history of HTN , currently denies any symptoms referable to elevated blood pressure. Specifically denies chest pain, palpitations, dyspnea, orthopnea, PND or peripheral edema. Blood pressure readings have been in normal range. Current medication regimen is as listed below. Patient denies any side effects of medication.   BP Readings from Last 6  Encounters:   09/27/22 130/87   06/07/22 (!) 154/104         Review of Systems  Constitutional, neuro, ENT, endocrine, pulmonary, cardiac, gastrointestinal, genitourinary, musculoskeletal, integument and psychiatric systems are negative, except as otherwise noted.    Patient Active Problem List    Diagnosis Date Noted     Chronic pain of left knee 05/18/2022     Priority: Medium     Added automatically from request for surgery [id number]        History reviewed. No pertinent past medical history.  Past Surgical History:   Procedure Laterality Date     ARTHROSCOPY KNEE WITH DEBRIDEMENT JOINT, COMBINED Left 6/7/2022    Procedure: left knee examination under anesthesia, arthroscopy, chondroplasty and debridement.  Staging for future cartilage surgery;  Surgeon: Ralf Robertson MD;  Location[place] [facility] [place]     Current Outpatient Medications   Medication Sig Dispense Refill     acetaminophen (TYLENOL) 325 MG tablet Take 2 tablets (650 mg) by mouth every 4 hours as needed for mild pain 50 tablet 1     amLODIPine (NORVASC) 5 MG tablet Take 10 mg by mouth daily       oxyCODONE (ROXICODONE) 5 MG tablet Take 1-2 tablets (5-10 mg) by mouth every 4 hours as needed for moderate to severe pain (Patient not taking: No sig reported) 10 tablet 0     senna-docusate (SENOKOT-S/PERICOLACE) 8.6-50 MG tablet Take 1-2 tablets by mouth 2 times daily (Patient not taking: No sig reported) 30 tablet 0       No Known Allergies     Social History     Tobacco Use     Smoking status: Current Every Day Smoker     Types: Cigarettes     Smokeless tobacco: Never Used     Tobacco comment: down to about 1 pack a week   Substance Use Topics     Alcohol use: Yes     Comment: occ.     History reviewed. No pertinent family history.  History   Drug Use     Types: Marijuana         Objective     /87 (BP Location: Right arm, Patient Position: Sitting, Cuff Size: Adult Large)   Pulse 97   Temp 97.9  F (36.6  C) (Oral)   Resp 15   Wt 108.3 kg (238  "lb 12.8 oz)   SpO2 97%   BMI 33.31 kg/m      Physical Exam    GENERAL APPEARANCE: healthy, alert and no distress     EYES: EOMI,  PERRL     HENT: ear canals and TM's normal and nose and mouth without ulcers or lesions     NECK: no adenopathy, no asymmetry, masses, or scars and thyroid normal to palpation     RESP: lungs clear to auscultation - no rales, rhonchi or wheezes     CV: regular rates and rhythm, normal S1 S2, no S3 or S4 and no murmur, click or rub     ABDOMEN:  soft, nontender, no HSM or masses and bowel sounds normal     MS: extremities normal- no gross deformities noted, no evidence of inflammation in joints, FROM in all extremities.     SKIN: no suspicious lesions or rashes     NEURO: Normal strength and tone, sensory exam grossly normal, mentation intact and speech normal     PSYCH: mentation appears normal. and affect normal/bright     LYMPHATICS: No cervical adenopathy    No results for input(s): HGB, PLT, INR, NA, POTASSIUM, CR, A1C in the last 54036 hours.     Diagnostics:  Labs pending at this time.  Results will be reviewed when available.   No EKG required, no history of coronary heart disease, significant arrhythmia, peripheral arterial disease or other structural heart disease.     EKG - 6/6/2022 though [facility] noted -  \"IMPRESSION   SINUS RHYTHM   VOLTAGE CRITERIA FOR LVH [MEETS CRITERIA IN ONE OF: R(aVL), S(V1), R(V5), R(V5/V6)+S(V1)]   MODERATE T-WAVE ABNORMALITY, CONSIDER ANTERIOR ISCHEMIA [-0.1+ mV T WAVE IN V3/V4]   ABNORMAL ECG   No Previous ECGs Available.     P-R Interval 154 ms   QRS Interval 98 ms   QT Interval 443 ms   QTC Interval 465 ms   P Axis 43   QRS Axis 28   T Wave Axis 21\"    Then 7/28/2022 [facility] transthoracic echo through [facility] noted -   \"SUMMARY     Estimated LV ejection fraction-lower limits of normal, 54%.   Asynchronous interventricular septal motion-nonspecific.   Mildly increased LV cavity size and wall thickness.   Normal right ventricular size and function. " "  No hemodynamically significant valvular abnormalities.   Inferior vena cava is normal in size with respiratory variation.   Dilated proximal aorta, 4 cm - ascending aorta.   Mobile interatrial septum.\"       Revised Cardiac Risk Index (RCRI):  The patient has the following serious cardiovascular risks for perioperative complications:   - No serious cardiac risks = 0 points     RCRI Interpretation: 0 points: Class I (very low risk - 0.4% complication rate)           Signed Electronically by: JARRED Becerra, CNP  Copy of this evaluation report is provided to requesting physician.      "

## 2022-09-27 NOTE — H&P (VIEW-ONLY)
"[facility] [address] [address]  Phone: [phone]  Fax: [phone]  Primary Provider: No Ref-Primary, Physician  Pre-op Performing Provider: DEJA COREA      PREOPERATIVE EVALUATION:  Today's date: 9/27/2022    Misha Buckner is a 32 year old male who presents for a preoperative evaluation.    Surgical Information:  Surgery/Procedure: Examination under anesthesia left knee, open osteochondral allograft transplantation,  Proximal tibial osteotomy  Surgery Location: [facility]  Surgeon: Ralf Robertson MD  Surgery Date: 10/11/2022  Time of Surgery: 11:50 AM  Where patient plans to recover: At home with family  Fax number for surgical facility: Note does not need to be faxed, will be available electronically in Epic.    Type of Anesthesia Anticipated: Choice    Assessment & Plan     The proposed surgical procedure is considered INTERMEDIATE risk.    Pre-operative cardiovascular examination  - Hemoglobin  - Basic metabolic panel    Hypertension   - Basic metabolic panel         RECOMMENDATION:  APPROVAL GIVEN to proceed with proposed procedure, without further diagnostic evaluation.        15 minutes spent on the date of the encounter doing chart review, history and exam, documentation and further activities per the note    {    Subjective     HPI related to upcoming procedure:     Examination under anesthesia left knee, open osteochondral allograft transplantation,  Proximal tibial osteotomy    Patient had surgery 6/7/2022 -  \"PROCEDURE:  1. Examination under anesthesia left knee  2. Left knee arthroscopy  3. Chondroplasty medial femoral condyle\"    Continues with left knee pain, discussed with ortho, plans on follow-up knee surgery.        Preop Questions 9/27/2022   1. Have you ever had a heart attack or stroke? No   2. Have you ever had surgery on your heart or blood vessels, such as a stent placement, a " coronary artery bypass, or surgery on an artery in your head, neck, heart, or legs? No   3. Do you have chest pain with activity? No   4. Do you have a history of  heart failure? No   5. Do you currently have a cold, bronchitis or symptoms of other infection? No   6. Do you have a cough, shortness of breath, or wheezing? No   7. Do you or anyone in your family have previous history of blood clots? No   8. Do you or does anyone in your family have a serious bleeding problem such as prolonged bleeding following surgeries or cuts? No   9. Have you ever had problems with anemia or been told to take iron pills? No   10. Have you had any abnormal blood loss such as black, tarry or bloody stools? No   11. Have you ever had a blood transfusion? No   12. Are you willing to have a blood transfusion if it is medically needed before, during, or after your surgery? Yes   13. Have you or any of your relatives ever had problems with anesthesia? No   14. Do you have sleep apnea, excessive snoring or daytime drowsiness? No   15. Do you have any artifical heart valves or other implanted medical devices like a pacemaker, defibrillator, or continuous glucose monitor? No   16. Do you have artificial joints? No   17. Are you allergic to latex? No     Health Care Directive:  Patient does not have a Health Care Directive or Living Will: Discussed advance care planning with patient; however, patient declined at this time.    Preoperative Review of :   reviewed - controlled substances reflected in medication list.      Status of Chronic Conditions:  HYPERTENSION - Patient has longstanding history of HTN , currently denies any symptoms referable to elevated blood pressure. Specifically denies chest pain, palpitations, dyspnea, orthopnea, PND or peripheral edema. Blood pressure readings have been in normal range. Current medication regimen is as listed below. Patient denies any side effects of medication.   BP Readings from Last 6  Encounters:   09/27/22 130/87   06/07/22 (!) 154/104         Review of Systems  Constitutional, neuro, ENT, endocrine, pulmonary, cardiac, gastrointestinal, genitourinary, musculoskeletal, integument and psychiatric systems are negative, except as otherwise noted.    Patient Active Problem List    Diagnosis Date Noted     Chronic pain of left knee 05/18/2022     Priority: Medium     Added automatically from request for surgery [id number]        History reviewed. No pertinent past medical history.  Past Surgical History:   Procedure Laterality Date     ARTHROSCOPY KNEE WITH DEBRIDEMENT JOINT, COMBINED Left 6/7/2022    Procedure: left knee examination under anesthesia, arthroscopy, chondroplasty and debridement.  Staging for future cartilage surgery;  Surgeon: Ralf Robertson MD;  Location: [facility]     Current Outpatient Medications   Medication Sig Dispense Refill     acetaminophen (TYLENOL) 325 MG tablet Take 2 tablets (650 mg) by mouth every 4 hours as needed for mild pain 50 tablet 1     amLODIPine (NORVASC) 5 MG tablet Take 10 mg by mouth daily       oxyCODONE (ROXICODONE) 5 MG tablet Take 1-2 tablets (5-10 mg) by mouth every 4 hours as needed for moderate to severe pain (Patient not taking: No sig reported) 10 tablet 0     senna-docusate (SENOKOT-S/PERICOLACE) 8.6-50 MG tablet Take 1-2 tablets by mouth 2 times daily (Patient not taking: No sig reported) 30 tablet 0       No Known Allergies     Social History     Tobacco Use     Smoking status: Current Every Day Smoker     Types: Cigarettes     Smokeless tobacco: Never Used     Tobacco comment: down to about 1 pack a week   Substance Use Topics     Alcohol use: Yes     Comment: occ.     History reviewed. No pertinent family history.  History   Drug Use     Types: Marijuana         Objective     /87 (BP Location: Right arm, Patient Position: Sitting, Cuff Size: Adult Large)   Pulse 97   Temp 97.9  F (36.6  C) (Oral)   Resp 15   Wt 108.3 kg (238  "lb 12.8 oz)   SpO2 97%   BMI 33.31 kg/m      Physical Exam    GENERAL APPEARANCE: healthy, alert and no distress     EYES: EOMI,  PERRL     HENT: ear canals and TM's normal and nose and mouth without ulcers or lesions     NECK: no adenopathy, no asymmetry, masses, or scars and thyroid normal to palpation     RESP: lungs clear to auscultation - no rales, rhonchi or wheezes     CV: regular rates and rhythm, normal S1 S2, no S3 or S4 and no murmur, click or rub     ABDOMEN:  soft, nontender, no HSM or masses and bowel sounds normal     MS: extremities normal- no gross deformities noted, no evidence of inflammation in joints, FROM in all extremities.     SKIN: no suspicious lesions or rashes     NEURO: Normal strength and tone, sensory exam grossly normal, mentation intact and speech normal     PSYCH: mentation appears normal. and affect normal/bright     LYMPHATICS: No cervical adenopathy    No results for input(s): HGB, PLT, INR, NA, POTASSIUM, CR, A1C in the last 59490 hours.     Diagnostics:  Labs pending at this time.  Results will be reviewed when available.   No EKG required, no history of coronary heart disease, significant arrhythmia, peripheral arterial disease or other structural heart disease.     EKG - 6/6/2022 though [facility] noted -  \"IMPRESSION   SINUS RHYTHM   VOLTAGE CRITERIA FOR LVH [MEETS CRITERIA IN ONE OF: R(aVL), S(V1), R(V5), R(V5/V6)+S(V1)]   MODERATE T-WAVE ABNORMALITY, CONSIDER ANTERIOR ISCHEMIA [-0.1+ mV T WAVE IN V3/V4]   ABNORMAL ECG   No Previous ECGs Available.     P-R Interval 154 ms   QRS Interval 98 ms   QT Interval 443 ms   QTC Interval 465 ms   P Axis 43   QRS Axis 28   T Wave Axis 21\"    Then 7/28/2022 [facility] transthoracic echo through [facility] noted -   \"SUMMARY     Estimated LV ejection fraction-lower limits of normal, 54%.   Asynchronous interventricular septal motion-nonspecific.   Mildly increased LV cavity size and wall thickness.   Normal right ventricular size and function. " "  No hemodynamically significant valvular abnormalities.   Inferior vena cava is normal in size with respiratory variation.   Dilated proximal aorta, 4 cm - ascending aorta.   Mobile interatrial septum.\"       Revised Cardiac Risk Index (RCRI):  The patient has the following serious cardiovascular risks for perioperative complications:   - No serious cardiac risks = 0 points     RCRI Interpretation: 0 points: Class I (very low risk - 0.4% complication rate)           Signed Electronically by: JARRED Becerra, CNP  Copy of this evaluation report is provided to requesting physician.      "

## 2022-09-27 NOTE — NURSING NOTE
Chief Complaint   Patient presents with     Pre-Op Exam     Concerned about the Healing process     Blood pressure 130/87, pulse 97, temperature 97.9  F (36.6  C), temperature source Oral, resp. rate 15, weight 108.3 kg (238 lb 12.8 oz), SpO2 97 %.    Kelli Scales

## 2022-10-07 ENCOUNTER — LAB (OUTPATIENT)
Dept: LAB | Facility: CLINIC | Age: 32
End: 2022-10-07
Payer: COMMERCIAL

## 2022-10-07 DIAGNOSIS — Z11.59 ENCOUNTER FOR SCREENING FOR OTHER VIRAL DISEASES: ICD-10-CM

## 2022-10-07 LAB — SARS-COV-2 RNA RESP QL NAA+PROBE: NEGATIVE

## 2022-10-07 PROCEDURE — U0003 INFECTIOUS AGENT DETECTION BY NUCLEIC ACID (DNA OR RNA); SEVERE ACUTE RESPIRATORY SYNDROME CORONAVIRUS 2 (SARS-COV-2) (CORONAVIRUS DISEASE [COVID-19]), AMPLIFIED PROBE TECHNIQUE, MAKING USE OF HIGH THROUGHPUT TECHNOLOGIES AS DESCRIBED BY CMS-2020-01-R: HCPCS | Mod: 90 | Performed by: PATHOLOGY

## 2022-10-07 PROCEDURE — U0005 INFEC AGEN DETEC AMPLI PROBE: HCPCS | Mod: 90 | Performed by: PATHOLOGY

## 2022-10-07 PROCEDURE — 99000 SPECIMEN HANDLING OFFICE-LAB: CPT | Performed by: PATHOLOGY

## 2022-10-10 ENCOUNTER — ANESTHESIA EVENT (OUTPATIENT)
Dept: SURGERY | Facility: CLINIC | Age: 32
End: 2022-10-10
Payer: COMMERCIAL

## 2022-10-10 ASSESSMENT — LIFESTYLE VARIABLES: TOBACCO_USE: 1

## 2022-10-10 NOTE — ANESTHESIA PREPROCEDURE EVALUATION
Anesthesia Pre-Procedure Evaluation    Patient: Misha Buckner   MRN: [id number] : 1990        Procedure : Procedure(s):  Examination under anesthesia left knee, open osteochondral allograft transplantation  Proximal tibial osteotomy          No past medical history on file.   Past Surgical History:   Procedure Laterality Date     ARTHROSCOPY KNEE WITH DEBRIDEMENT JOINT, COMBINED Left 2022    Procedure: left knee examination under anesthesia, arthroscopy, chondroplasty and debridement.  Staging for future cartilage surgery;  Surgeon: Ralf Robertson MD;  Location: [facility] OR      No Known Allergies   Social History     Tobacco Use     Smoking status: Every Day     Types: Cigarettes     Smokeless tobacco: Never     Tobacco comments:     down to about 1 pack a week   Substance Use Topics     Alcohol use: Yes     Comment: occ.      Wt Readings from Last 1 Encounters:   22 108.3 kg (238 lb 12.8 oz)        Anesthesia Evaluation   Pt has had prior anesthetic. Type: General.    No history of anesthetic complications       ROS/MED HX  ENT/Pulmonary:     (+) tobacco use, Current use,     Neurologic:  - neg neurologic ROS     Cardiovascular: Comment: TTE  - The Ejection Fraction is estimated at 50-55%. Mild concentric wall thickening  consistent with left ventricular hypertrophy is present. Pulmonary artery systolic pressure is normal. The inferior vena cava  is normal. No  pericardial effusion is present.    (+) hypertension-range: on amlodipine/ ----    METS/Exercise Tolerance:     Hematologic:  - neg hematologic  ROS     Musculoskeletal:       GI/Hepatic:  - neg GI/hepatic ROS     Renal/Genitourinary:  - neg Renal ROS     Endo:  - neg endo ROS     Psychiatric/Substance Use:     (+) Recreational drug usage: Cannabis.    Infectious Disease:  - neg infectious disease ROS     Malignancy:  - neg malignancy ROS     Other:      (+) , H/O Chronic Pain,        Physical Exam    Airway   unable to  assess     Mallampati: I   TM distance: > 3 FB   Neck ROM: full   Mouth opening: > 3 cm    Respiratory Devices and Support         Dental  no notable dental history         Cardiovascular   cardiovascular exam normal          Pulmonary   pulmonary exam normal                OUTSIDE LABS:  CBC:   Lab Results   Component Value Date    WBC 5.3 12/13/2011    HGB 16.3 09/27/2022    HGB 14.4 12/13/2011    HCT 41.9 12/13/2011     12/13/2011     BMP:   Lab Results   Component Value Date     09/27/2022    .0 12/13/2011    POTASSIUM 4.6 09/27/2022    POTASSIUM 4.0 12/13/2011    CHLORIDE 104 09/27/2022    CHLORIDE 103.0 12/13/2011    CO2 22 09/27/2022    CO2 28.0 12/13/2011    BUN 11.3 09/27/2022    BUN 7.0 12/13/2011    CR 1.08 09/27/2022    CR 1.0 12/13/2011     (H) 09/27/2022    GLC 99.0 12/13/2011     COAGS: No results found for: PTT, INR, FIBR  POC: No results found for: BGM, HCG, HCGS  HEPATIC: No results found for: ALBUMIN, PROTTOTAL, ALT, AST, GGT, ALKPHOS, BILITOTAL, BILIDIRECT, WILDER  OTHER:   Lab Results   Component Value Date    CARRILLO 10.6 (H) 09/27/2022       Anesthesia Plan    ASA Status:  2      Anesthesia Type: General.     - Airway: LMA   Induction: Intravenous.   Maintenance: Balanced.        Consents    Anesthesia Plan(s) and associated risks, benefits, and realistic alternatives discussed. Questions answered and patient/representative(s) expressed understanding.    - Discussed:     - Discussed with:  Patient         Postoperative Care    Pain management: IV analgesics, Oral pain medications, Multi-modal analgesia, Peripheral nerve block (Single Shot).   PONV prophylaxis: Dexamethasone or Solumedrol, Ondansetron (or other 5HT-3)     Comments:                Timur Freeman MD

## 2022-10-11 ENCOUNTER — HOSPITAL ENCOUNTER (OUTPATIENT)
Facility: CLINIC | Age: 32
Discharge: HOME | End: 2022-10-12
Attending: ORTHOPAEDIC SURGERY | Admitting: ORTHOPAEDIC SURGERY
Payer: COMMERCIAL

## 2022-10-11 ENCOUNTER — ANESTHESIA (OUTPATIENT)
Dept: SURGERY | Facility: CLINIC | Age: 32
End: 2022-10-11
Payer: COMMERCIAL

## 2022-10-11 ENCOUNTER — APPOINTMENT (OUTPATIENT)
Dept: GENERAL RADIOLOGY | Facility: CLINIC | Age: 32
End: 2022-10-11
Attending: ORTHOPAEDIC SURGERY
Payer: COMMERCIAL

## 2022-10-11 DIAGNOSIS — G89.29 CHRONIC PAIN OF LEFT KNEE: Primary | ICD-10-CM

## 2022-10-11 DIAGNOSIS — M25.562 CHRONIC PAIN OF LEFT KNEE: Primary | ICD-10-CM

## 2022-10-11 LAB — GLUCOSE BLDC GLUCOMTR-MCNC: 110 MG/DL (ref 70–99)

## 2022-10-11 PROCEDURE — 250N000011 HC RX IP 250 OP 636

## 2022-10-11 PROCEDURE — 250N000013 HC RX MED GY IP 250 OP 250 PS 637: Performed by: PHYSICIAN ASSISTANT

## 2022-10-11 PROCEDURE — 999N000180 XR SURGERY CARM FLUORO LESS THAN 5 MIN: Mod: TC

## 2022-10-11 PROCEDURE — 258N000001 HC RX 258: Performed by: ORTHOPAEDIC SURGERY

## 2022-10-11 PROCEDURE — 250N000009 HC RX 250: Performed by: ORTHOPAEDIC SURGERY

## 2022-10-11 PROCEDURE — 250N000013 HC RX MED GY IP 250 OP 250 PS 637: Performed by: STUDENT IN AN ORGANIZED HEALTH CARE EDUCATION/TRAINING PROGRAM

## 2022-10-11 PROCEDURE — C1713 ANCHOR/SCREW BN/BN,TIS/BN: HCPCS | Performed by: ORTHOPAEDIC SURGERY

## 2022-10-11 PROCEDURE — 250N000011 HC RX IP 250 OP 636: Performed by: STUDENT IN AN ORGANIZED HEALTH CARE EDUCATION/TRAINING PROGRAM

## 2022-10-11 PROCEDURE — 999N000141 HC STATISTIC PRE-PROCEDURE NURSING ASSESSMENT: Performed by: ORTHOPAEDIC SURGERY

## 2022-10-11 PROCEDURE — 258N000003 HC RX IP 258 OP 636: Performed by: STUDENT IN AN ORGANIZED HEALTH CARE EDUCATION/TRAINING PROGRAM

## 2022-10-11 PROCEDURE — 272N000001 HC OR GENERAL SUPPLY STERILE: Performed by: ORTHOPAEDIC SURGERY

## 2022-10-11 PROCEDURE — 370N000017 HC ANESTHESIA TECHNICAL FEE, PER MIN: Performed by: ORTHOPAEDIC SURGERY

## 2022-10-11 PROCEDURE — 250N000011 HC RX IP 250 OP 636: Performed by: PHYSICIAN ASSISTANT

## 2022-10-11 PROCEDURE — 82962 GLUCOSE BLOOD TEST: CPT

## 2022-10-11 PROCEDURE — 710N000010 HC RECOVERY PHASE 1, LEVEL 2, PER MIN: Performed by: ORTHOPAEDIC SURGERY

## 2022-10-11 PROCEDURE — 250N000011 HC RX IP 250 OP 636: Performed by: NURSE ANESTHETIST, CERTIFIED REGISTERED

## 2022-10-11 PROCEDURE — 250N000009 HC RX 250: Performed by: STUDENT IN AN ORGANIZED HEALTH CARE EDUCATION/TRAINING PROGRAM

## 2022-10-11 PROCEDURE — 250N000025 HC SEVOFLURANE, PER MIN: Performed by: ORTHOPAEDIC SURGERY

## 2022-10-11 PROCEDURE — 250N000013 HC RX MED GY IP 250 OP 250 PS 637

## 2022-10-11 PROCEDURE — 27415 OSTEOCHONDRAL KNEE ALLOGRAFT: CPT | Mod: LT | Performed by: ORTHOPAEDIC SURGERY

## 2022-10-11 PROCEDURE — 27457 REALIGNMENT OF KNEE: CPT | Mod: LT | Performed by: ORTHOPAEDIC SURGERY

## 2022-10-11 PROCEDURE — 360N000084 HC SURGERY LEVEL 4 W/ FLUORO, PER MIN: Performed by: ORTHOPAEDIC SURGERY

## 2022-10-11 PROCEDURE — C1762 CONN TISS, HUMAN(INC FASCIA): HCPCS | Performed by: ORTHOPAEDIC SURGERY

## 2022-10-11 DEVICE — GRAFT BONE PASTE GRAFTON 5ML T45005: Type: IMPLANTABLE DEVICE | Site: KNEE | Status: FUNCTIONAL

## 2022-10-11 DEVICE — HTO IMPL,IBAL LG 7°/X-LG 6°
Type: IMPLANTABLE DEVICE | Site: KNEE | Status: FUNCTIONAL
Brand: ARTHREX®

## 2022-10-11 DEVICE — HTO ANCHOR, IBALANCE CANCELLOUS 26MM
Type: IMPLANTABLE DEVICE | Site: KNEE | Status: FUNCTIONAL
Brand: ARTHREX®

## 2022-10-11 DEVICE — HTO ANCHOR, IBALANCE CANCELLOUS 22MM
Type: IMPLANTABLE DEVICE | Site: KNEE | Status: FUNCTIONAL
Brand: ARTHREX®

## 2022-10-11 DEVICE — IMPLANTABLE DEVICE: Type: IMPLANTABLE DEVICE | Site: KNEE | Status: FUNCTIONAL

## 2022-10-11 DEVICE — HTO ANCHOR, IBALANCE CORTICAL 44MM
Type: IMPLANTABLE DEVICE | Site: KNEE | Status: FUNCTIONAL
Brand: ARTHREX®

## 2022-10-11 RX ORDER — MAGNESIUM HYDROXIDE 1200 MG/15ML
LIQUID ORAL PRN
Status: DISCONTINUED | OUTPATIENT
Start: 2022-10-11 | End: 2022-10-11 | Stop reason: HOSPADM

## 2022-10-11 RX ORDER — ACETAMINOPHEN 325 MG/1
650 TABLET ORAL EVERY 4 HOURS PRN
Qty: 100 TABLET | Refills: 0 | Status: SHIPPED | OUTPATIENT
Start: 2022-10-11

## 2022-10-11 RX ORDER — CEFAZOLIN SODIUM 2 G/100ML
2 INJECTION, SOLUTION INTRAVENOUS EVERY 8 HOURS
Status: COMPLETED | OUTPATIENT
Start: 2022-10-11 | End: 2022-10-12

## 2022-10-11 RX ORDER — DEXAMETHASONE SODIUM PHOSPHATE 4 MG/ML
INJECTION, SOLUTION INTRA-ARTICULAR; INTRALESIONAL; INTRAMUSCULAR; INTRAVENOUS; SOFT TISSUE PRN
Status: DISCONTINUED | OUTPATIENT
Start: 2022-10-11 | End: 2022-10-11

## 2022-10-11 RX ORDER — ONDANSETRON 2 MG/ML
INJECTION INTRAMUSCULAR; INTRAVENOUS PRN
Status: DISCONTINUED | OUTPATIENT
Start: 2022-10-11 | End: 2022-10-11

## 2022-10-11 RX ORDER — ONDANSETRON 2 MG/ML
4 INJECTION INTRAMUSCULAR; INTRAVENOUS EVERY 30 MIN PRN
Status: DISCONTINUED | OUTPATIENT
Start: 2022-10-11 | End: 2022-10-12 | Stop reason: HOSPADM

## 2022-10-11 RX ORDER — CEFAZOLIN SODIUM/WATER 2 G/20 ML
2 SYRINGE (ML) INTRAVENOUS SEE ADMIN INSTRUCTIONS
Status: DISCONTINUED | OUTPATIENT
Start: 2022-10-11 | End: 2022-10-11 | Stop reason: HOSPADM

## 2022-10-11 RX ORDER — GABAPENTIN 300 MG/1
300 CAPSULE ORAL
Status: COMPLETED | OUTPATIENT
Start: 2022-10-11 | End: 2022-10-11

## 2022-10-11 RX ORDER — NALOXONE HYDROCHLORIDE 0.4 MG/ML
0.2 INJECTION, SOLUTION INTRAMUSCULAR; INTRAVENOUS; SUBCUTANEOUS
Status: DISCONTINUED | OUTPATIENT
Start: 2022-10-11 | End: 2022-10-12 | Stop reason: HOSPADM

## 2022-10-11 RX ORDER — BISACODYL 10 MG
10 SUPPOSITORY, RECTAL RECTAL DAILY PRN
Status: DISCONTINUED | OUTPATIENT
Start: 2022-10-11 | End: 2022-10-12 | Stop reason: HOSPADM

## 2022-10-11 RX ORDER — ASPIRIN 81 MG/1
81 TABLET ORAL 2 TIMES DAILY
Qty: 60 TABLET | Refills: 0 | Status: SHIPPED | OUTPATIENT
Start: 2022-10-11 | End: 2024-01-11

## 2022-10-11 RX ORDER — TRANEXAMIC ACID 650 MG/1
1950 TABLET ORAL ONCE
Status: COMPLETED | OUTPATIENT
Start: 2022-10-11 | End: 2022-10-11

## 2022-10-11 RX ORDER — FENTANYL CITRATE 50 UG/ML
25-50 INJECTION, SOLUTION INTRAMUSCULAR; INTRAVENOUS
Status: DISCONTINUED | OUTPATIENT
Start: 2022-10-11 | End: 2022-10-11 | Stop reason: HOSPADM

## 2022-10-11 RX ORDER — AMOXICILLIN 250 MG
1 CAPSULE ORAL 2 TIMES DAILY
Status: DISCONTINUED | OUTPATIENT
Start: 2022-10-11 | End: 2022-10-12 | Stop reason: HOSPADM

## 2022-10-11 RX ORDER — FENTANYL CITRATE-0.9 % NACL/PF 10 MCG/ML
PLASTIC BAG, INJECTION (ML) INTRAVENOUS PRN
Status: DISCONTINUED | OUTPATIENT
Start: 2022-10-11 | End: 2022-10-11

## 2022-10-11 RX ORDER — SODIUM CHLORIDE, SODIUM LACTATE, POTASSIUM CHLORIDE, CALCIUM CHLORIDE 600; 310; 30; 20 MG/100ML; MG/100ML; MG/100ML; MG/100ML
INJECTION, SOLUTION INTRAVENOUS CONTINUOUS
Status: DISCONTINUED | OUTPATIENT
Start: 2022-10-11 | End: 2022-10-11 | Stop reason: HOSPADM

## 2022-10-11 RX ORDER — AMLODIPINE BESYLATE 10 MG/1
10 TABLET ORAL DAILY
Status: DISCONTINUED | OUTPATIENT
Start: 2022-10-12 | End: 2022-10-12 | Stop reason: HOSPADM

## 2022-10-11 RX ORDER — NALOXONE HYDROCHLORIDE 0.4 MG/ML
0.4 INJECTION, SOLUTION INTRAMUSCULAR; INTRAVENOUS; SUBCUTANEOUS
Status: DISCONTINUED | OUTPATIENT
Start: 2022-10-11 | End: 2022-10-11

## 2022-10-11 RX ORDER — ACETAMINOPHEN 325 MG/1
650 TABLET ORAL EVERY 4 HOURS PRN
Status: DISCONTINUED | OUTPATIENT
Start: 2022-10-14 | End: 2022-10-12 | Stop reason: HOSPADM

## 2022-10-11 RX ORDER — NALOXONE HYDROCHLORIDE 0.4 MG/ML
0.2 INJECTION, SOLUTION INTRAMUSCULAR; INTRAVENOUS; SUBCUTANEOUS
Status: DISCONTINUED | OUTPATIENT
Start: 2022-10-11 | End: 2022-10-11

## 2022-10-11 RX ORDER — NALOXONE HYDROCHLORIDE 0.4 MG/ML
0.4 INJECTION, SOLUTION INTRAMUSCULAR; INTRAVENOUS; SUBCUTANEOUS
Status: DISCONTINUED | OUTPATIENT
Start: 2022-10-11 | End: 2022-10-12 | Stop reason: HOSPADM

## 2022-10-11 RX ORDER — ASPIRIN 81 MG/1
81 TABLET ORAL 2 TIMES DAILY
Status: DISCONTINUED | OUTPATIENT
Start: 2022-10-11 | End: 2022-10-12 | Stop reason: HOSPADM

## 2022-10-11 RX ORDER — OXYCODONE HYDROCHLORIDE 10 MG/1
10 TABLET ORAL EVERY 4 HOURS PRN
Status: DISCONTINUED | OUTPATIENT
Start: 2022-10-11 | End: 2022-10-12 | Stop reason: HOSPADM

## 2022-10-11 RX ORDER — LIDOCAINE HYDROCHLORIDE 20 MG/ML
INJECTION, SOLUTION INFILTRATION; PERINEURAL PRN
Status: DISCONTINUED | OUTPATIENT
Start: 2022-10-11 | End: 2022-10-11

## 2022-10-11 RX ORDER — LIDOCAINE 40 MG/G
CREAM TOPICAL
Status: DISCONTINUED | OUTPATIENT
Start: 2022-10-11 | End: 2022-10-11 | Stop reason: HOSPADM

## 2022-10-11 RX ORDER — AMOXICILLIN 250 MG
1-2 CAPSULE ORAL 2 TIMES DAILY
Qty: 30 TABLET | Refills: 0 | Status: SHIPPED | OUTPATIENT
Start: 2022-10-11 | End: 2024-01-11

## 2022-10-11 RX ORDER — FLUMAZENIL 0.1 MG/ML
0.2 INJECTION, SOLUTION INTRAVENOUS
Status: DISCONTINUED | OUTPATIENT
Start: 2022-10-11 | End: 2022-10-11 | Stop reason: HOSPADM

## 2022-10-11 RX ORDER — DEXAMETHASONE SODIUM PHOSPHATE 10 MG/ML
INJECTION, SOLUTION INTRAMUSCULAR; INTRAVENOUS
Status: COMPLETED | OUTPATIENT
Start: 2022-10-11 | End: 2022-10-11

## 2022-10-11 RX ORDER — KETOROLAC TROMETHAMINE 30 MG/ML
INJECTION, SOLUTION INTRAMUSCULAR; INTRAVENOUS PRN
Status: DISCONTINUED | OUTPATIENT
Start: 2022-10-11 | End: 2022-10-11

## 2022-10-11 RX ORDER — HYDROXYZINE HYDROCHLORIDE 25 MG/1
25 TABLET, FILM COATED ORAL EVERY 6 HOURS PRN
Status: DISCONTINUED | OUTPATIENT
Start: 2022-10-11 | End: 2022-10-12 | Stop reason: HOSPADM

## 2022-10-11 RX ORDER — LABETALOL HYDROCHLORIDE 5 MG/ML
5 INJECTION, SOLUTION INTRAVENOUS ONCE
Status: COMPLETED | OUTPATIENT
Start: 2022-10-11 | End: 2022-10-11

## 2022-10-11 RX ORDER — OXYCODONE HYDROCHLORIDE 5 MG/1
5 TABLET ORAL EVERY 4 HOURS PRN
Status: DISCONTINUED | OUTPATIENT
Start: 2022-10-11 | End: 2022-10-12 | Stop reason: HOSPADM

## 2022-10-11 RX ORDER — HYDROMORPHONE HYDROCHLORIDE 1 MG/ML
0.2 INJECTION, SOLUTION INTRAMUSCULAR; INTRAVENOUS; SUBCUTANEOUS
Status: DISCONTINUED | OUTPATIENT
Start: 2022-10-11 | End: 2022-10-12 | Stop reason: HOSPADM

## 2022-10-11 RX ORDER — OXYCODONE HYDROCHLORIDE 5 MG/1
5 TABLET ORAL EVERY 4 HOURS PRN
Status: DISCONTINUED | OUTPATIENT
Start: 2022-10-11 | End: 2022-10-11 | Stop reason: HOSPADM

## 2022-10-11 RX ORDER — ONDANSETRON 2 MG/ML
4 INJECTION INTRAMUSCULAR; INTRAVENOUS EVERY 6 HOURS PRN
Status: DISCONTINUED | OUTPATIENT
Start: 2022-10-11 | End: 2022-10-12 | Stop reason: HOSPADM

## 2022-10-11 RX ORDER — SODIUM CHLORIDE, SODIUM LACTATE, POTASSIUM CHLORIDE, CALCIUM CHLORIDE 600; 310; 30; 20 MG/100ML; MG/100ML; MG/100ML; MG/100ML
INJECTION, SOLUTION INTRAVENOUS CONTINUOUS PRN
Status: DISCONTINUED | OUTPATIENT
Start: 2022-10-11 | End: 2022-10-11

## 2022-10-11 RX ORDER — ONDANSETRON 4 MG/1
4 TABLET, ORALLY DISINTEGRATING ORAL EVERY 6 HOURS PRN
Status: DISCONTINUED | OUTPATIENT
Start: 2022-10-11 | End: 2022-10-12 | Stop reason: HOSPADM

## 2022-10-11 RX ORDER — HYDROMORPHONE HYDROCHLORIDE 1 MG/ML
0.4 INJECTION, SOLUTION INTRAMUSCULAR; INTRAVENOUS; SUBCUTANEOUS
Status: DISCONTINUED | OUTPATIENT
Start: 2022-10-11 | End: 2022-10-12 | Stop reason: HOSPADM

## 2022-10-11 RX ORDER — POLYETHYLENE GLYCOL 3350 17 G/17G
17 POWDER, FOR SOLUTION ORAL DAILY
Status: DISCONTINUED | OUTPATIENT
Start: 2022-10-12 | End: 2022-10-12 | Stop reason: HOSPADM

## 2022-10-11 RX ORDER — ACETAMINOPHEN 325 MG/1
975 TABLET ORAL ONCE
Status: DISCONTINUED | OUTPATIENT
Start: 2022-10-11 | End: 2022-10-11

## 2022-10-11 RX ORDER — OXYCODONE HYDROCHLORIDE 5 MG/1
5-10 TABLET ORAL EVERY 4 HOURS PRN
Qty: 16 TABLET | Refills: 0 | Status: SHIPPED | OUTPATIENT
Start: 2022-10-11 | End: 2022-10-26

## 2022-10-11 RX ORDER — LABETALOL HYDROCHLORIDE 5 MG/ML
10 INJECTION, SOLUTION INTRAVENOUS ONCE
Status: COMPLETED | OUTPATIENT
Start: 2022-10-11 | End: 2022-10-11

## 2022-10-11 RX ORDER — ACETAMINOPHEN 325 MG/1
975 TABLET ORAL ONCE
Status: COMPLETED | OUTPATIENT
Start: 2022-10-11 | End: 2022-10-11

## 2022-10-11 RX ORDER — PROCHLORPERAZINE MALEATE 10 MG
10 TABLET ORAL EVERY 6 HOURS PRN
Status: DISCONTINUED | OUTPATIENT
Start: 2022-10-11 | End: 2022-10-12 | Stop reason: HOSPADM

## 2022-10-11 RX ORDER — PROPOFOL 10 MG/ML
INJECTION, EMULSION INTRAVENOUS PRN
Status: DISCONTINUED | OUTPATIENT
Start: 2022-10-11 | End: 2022-10-11

## 2022-10-11 RX ORDER — BUPIVACAINE HYDROCHLORIDE 2.5 MG/ML
INJECTION, SOLUTION EPIDURAL; INFILTRATION; INTRACAUDAL
Status: COMPLETED | OUTPATIENT
Start: 2022-10-11 | End: 2022-10-11

## 2022-10-11 RX ORDER — ACETAMINOPHEN 325 MG/1
975 TABLET ORAL EVERY 8 HOURS
Status: DISCONTINUED | OUTPATIENT
Start: 2022-10-11 | End: 2022-10-12 | Stop reason: HOSPADM

## 2022-10-11 RX ORDER — DEXMEDETOMIDINE HYDROCHLORIDE 4 UG/ML
INJECTION, SOLUTION INTRAVENOUS
Status: COMPLETED | OUTPATIENT
Start: 2022-10-11 | End: 2022-10-11

## 2022-10-11 RX ORDER — LIDOCAINE 40 MG/G
CREAM TOPICAL
Status: DISCONTINUED | OUTPATIENT
Start: 2022-10-11 | End: 2022-10-12 | Stop reason: HOSPADM

## 2022-10-11 RX ORDER — ONDANSETRON 4 MG/1
4 TABLET, ORALLY DISINTEGRATING ORAL EVERY 30 MIN PRN
Status: DISCONTINUED | OUTPATIENT
Start: 2022-10-11 | End: 2022-10-12 | Stop reason: HOSPADM

## 2022-10-11 RX ORDER — FENTANYL CITRATE 50 UG/ML
25 INJECTION, SOLUTION INTRAMUSCULAR; INTRAVENOUS EVERY 5 MIN PRN
Status: DISCONTINUED | OUTPATIENT
Start: 2022-10-11 | End: 2022-10-11

## 2022-10-11 RX ORDER — CEFAZOLIN SODIUM/WATER 2 G/20 ML
2 SYRINGE (ML) INTRAVENOUS
Status: COMPLETED | OUTPATIENT
Start: 2022-10-11 | End: 2022-10-11

## 2022-10-11 RX ORDER — SODIUM CHLORIDE, SODIUM LACTATE, POTASSIUM CHLORIDE, CALCIUM CHLORIDE 600; 310; 30; 20 MG/100ML; MG/100ML; MG/100ML; MG/100ML
INJECTION, SOLUTION INTRAVENOUS CONTINUOUS
Status: DISCONTINUED | OUTPATIENT
Start: 2022-10-11 | End: 2022-10-12 | Stop reason: HOSPADM

## 2022-10-11 RX ORDER — HYDROMORPHONE HYDROCHLORIDE 1 MG/ML
0.2 INJECTION, SOLUTION INTRAMUSCULAR; INTRAVENOUS; SUBCUTANEOUS EVERY 5 MIN PRN
Status: DISCONTINUED | OUTPATIENT
Start: 2022-10-11 | End: 2022-10-11

## 2022-10-11 RX ORDER — HYDROXYZINE HYDROCHLORIDE 25 MG/1
25 TABLET, FILM COATED ORAL EVERY 6 HOURS PRN
Qty: 30 TABLET | Refills: 0 | Status: SHIPPED | OUTPATIENT
Start: 2022-10-11 | End: 2024-01-11

## 2022-10-11 RX ORDER — SODIUM CHLORIDE, SODIUM LACTATE, POTASSIUM CHLORIDE, CALCIUM CHLORIDE 600; 310; 30; 20 MG/100ML; MG/100ML; MG/100ML; MG/100ML
INJECTION, SOLUTION INTRAVENOUS CONTINUOUS
Status: DISCONTINUED | OUTPATIENT
Start: 2022-10-11 | End: 2022-10-11

## 2022-10-11 RX ADMIN — LABETALOL HYDROCHLORIDE 5 MG: 5 INJECTION, SOLUTION INTRAVENOUS at 17:09

## 2022-10-11 RX ADMIN — FENTANYL CITRATE 25 MCG: 0.05 INJECTION, SOLUTION INTRAMUSCULAR; INTRAVENOUS at 13:24

## 2022-10-11 RX ADMIN — ASPIRIN 81 MG: 81 TABLET, COATED ORAL at 20:24

## 2022-10-11 RX ADMIN — SODIUM CHLORIDE, POTASSIUM CHLORIDE, SODIUM LACTATE AND CALCIUM CHLORIDE: 600; 310; 30; 20 INJECTION, SOLUTION INTRAVENOUS at 12:43

## 2022-10-11 RX ADMIN — CEFAZOLIN SODIUM 2 G: 2 INJECTION, SOLUTION INTRAVENOUS at 20:24

## 2022-10-11 RX ADMIN — TRANEXAMIC ACID 1950 MG: 650 TABLET ORAL at 10:10

## 2022-10-11 RX ADMIN — DEXMEDETOMIDINE 20 MCG: 100 INJECTION, SOLUTION, CONCENTRATE INTRAVENOUS at 10:50

## 2022-10-11 RX ADMIN — GABAPENTIN 300 MG: 300 CAPSULE ORAL at 10:10

## 2022-10-11 RX ADMIN — KETOROLAC TROMETHAMINE 30 MG: 30 INJECTION, SOLUTION INTRAMUSCULAR at 15:15

## 2022-10-11 RX ADMIN — FENTANYL CITRATE 25 MCG: 0.05 INJECTION, SOLUTION INTRAMUSCULAR; INTRAVENOUS at 16:26

## 2022-10-11 RX ADMIN — SENNOSIDES AND DOCUSATE SODIUM 1 TABLET: 50; 8.6 TABLET ORAL at 20:24

## 2022-10-11 RX ADMIN — Medication 50 MCG: at 13:37

## 2022-10-11 RX ADMIN — FENTANYL CITRATE 25 MCG: 0.05 INJECTION, SOLUTION INTRAMUSCULAR; INTRAVENOUS at 14:26

## 2022-10-11 RX ADMIN — FENTANYL CITRATE 25 MCG: 0.05 INJECTION, SOLUTION INTRAMUSCULAR; INTRAVENOUS at 16:04

## 2022-10-11 RX ADMIN — HYDROXYZINE HYDROCHLORIDE 25 MG: 25 TABLET, FILM COATED ORAL at 17:59

## 2022-10-11 RX ADMIN — ONDANSETRON 4 MG: 2 INJECTION INTRAMUSCULAR; INTRAVENOUS at 15:15

## 2022-10-11 RX ADMIN — LABETALOL HYDROCHLORIDE 10 MG: 5 INJECTION, SOLUTION INTRAVENOUS at 17:25

## 2022-10-11 RX ADMIN — OXYCODONE HYDROCHLORIDE 5 MG: 5 TABLET ORAL at 17:11

## 2022-10-11 RX ADMIN — FENTANYL CITRATE 25 MCG: 0.05 INJECTION, SOLUTION INTRAMUSCULAR; INTRAVENOUS at 14:07

## 2022-10-11 RX ADMIN — Medication 2 G: at 12:52

## 2022-10-11 RX ADMIN — HYDROMORPHONE HYDROCHLORIDE 0.2 MG: 1 INJECTION, SOLUTION INTRAMUSCULAR; INTRAVENOUS; SUBCUTANEOUS at 16:39

## 2022-10-11 RX ADMIN — SODIUM CHLORIDE, POTASSIUM CHLORIDE, SODIUM LACTATE AND CALCIUM CHLORIDE: 600; 310; 30; 20 INJECTION, SOLUTION INTRAVENOUS at 10:40

## 2022-10-11 RX ADMIN — ACETAMINOPHEN 975 MG: 325 TABLET, FILM COATED ORAL at 10:10

## 2022-10-11 RX ADMIN — HYDROMORPHONE HYDROCHLORIDE 0.2 MG: 1 INJECTION, SOLUTION INTRAMUSCULAR; INTRAVENOUS; SUBCUTANEOUS at 20:46

## 2022-10-11 RX ADMIN — DEXAMETHASONE SODIUM PHOSPHATE 6 MG: 4 INJECTION, SOLUTION INTRA-ARTICULAR; INTRALESIONAL; INTRAMUSCULAR; INTRAVENOUS; SOFT TISSUE at 12:49

## 2022-10-11 RX ADMIN — ACETAMINOPHEN 975 MG: 325 TABLET, FILM COATED ORAL at 17:11

## 2022-10-11 RX ADMIN — MIDAZOLAM HYDROCHLORIDE 1 MG: 1 INJECTION, SOLUTION INTRAMUSCULAR; INTRAVENOUS at 10:46

## 2022-10-11 RX ADMIN — PROPOFOL 330 MG: 10 INJECTION, EMULSION INTRAVENOUS at 12:49

## 2022-10-11 RX ADMIN — HYDROMORPHONE HYDROCHLORIDE 0.2 MG: 1 INJECTION, SOLUTION INTRAMUSCULAR; INTRAVENOUS; SUBCUTANEOUS at 16:33

## 2022-10-11 RX ADMIN — OXYCODONE HYDROCHLORIDE 5 MG: 5 TABLET ORAL at 17:59

## 2022-10-11 RX ADMIN — BUPIVACAINE HYDROCHLORIDE 20 ML: 2.5 INJECTION, SOLUTION EPIDURAL; INFILTRATION; INTRACAUDAL at 10:50

## 2022-10-11 RX ADMIN — FENTANYL CITRATE 50 MCG: 0.05 INJECTION, SOLUTION INTRAMUSCULAR; INTRAVENOUS at 13:09

## 2022-10-11 RX ADMIN — LIDOCAINE HYDROCHLORIDE 100 MG: 20 INJECTION, SOLUTION INFILTRATION; PERINEURAL at 12:49

## 2022-10-11 RX ADMIN — FENTANYL CITRATE 25 MCG: 0.05 INJECTION, SOLUTION INTRAMUSCULAR; INTRAVENOUS at 14:40

## 2022-10-11 RX ADMIN — DEXAMETHASONE SODIUM PHOSPHATE 1 MG: 10 INJECTION, SOLUTION INTRAMUSCULAR; INTRAVENOUS at 10:50

## 2022-10-11 RX ADMIN — FENTANYL CITRATE 50 MCG: 0.05 INJECTION, SOLUTION INTRAMUSCULAR; INTRAVENOUS at 10:46

## 2022-10-11 RX ADMIN — HYDROMORPHONE HYDROCHLORIDE 0.5 MG: 1 INJECTION, SOLUTION INTRAMUSCULAR; INTRAVENOUS; SUBCUTANEOUS at 15:35

## 2022-10-11 RX ADMIN — Medication 50 MCG: at 13:48

## 2022-10-11 ASSESSMENT — ACTIVITIES OF DAILY LIVING (ADL)
ADLS_ACUITY_SCORE: 36
ADLS_ACUITY_SCORE: 31
ADLS_ACUITY_SCORE: 36
ADLS_ACUITY_SCORE: 37
ADLS_ACUITY_SCORE: 31
ADLS_ACUITY_SCORE: 36
ADLS_ACUITY_SCORE: 31

## 2022-10-11 NOTE — ANESTHESIA POSTPROCEDURE EVALUATION
Patient: Misha Buckner    Procedure: Procedure(s):  Examination under anesthesia left knee, open osteochondral allograft transplantation  Proximal tibial osteotomy       Anesthesia Type:  General    Note:  Disposition: Inpatient   Postop Pain Control: Uneventful            Sign Out: Well controlled pain   PONV: No   Neuro/Psych: Uneventful            Sign Out: Acceptable/Baseline neuro status   Airway/Respiratory: Uneventful            Sign Out: Acceptable/Baseline resp. status   CV/Hemodynamics: Uneventful            Sign Out: Acceptable CV status; No obvious hypovolemia; No obvious fluid overload   Other NRE: NONE   DID A NON-ROUTINE EVENT OCCUR? No           Last vitals:  Vitals Value Taken Time   /103 10/11/22 1745   Temp 37.3  C (99.2  F) 10/11/22 1550   Pulse 77 10/11/22 1741   Resp 27 10/11/22 1733   SpO2 97 % 10/11/22 1745   Vitals shown include unvalidated device data.    Electronically Signed By: Tiana Morales MD  October 11, 2022  6:54 PM

## 2022-10-11 NOTE — ANESTHESIA CARE TRANSFER NOTE
Patient: Misha Bucnker    Procedure: Procedure(s):  Examination under anesthesia left knee, open osteochondral allograft transplantation  Proximal tibial osteotomy       Diagnosis: Chronic pain of left knee [M25.562, G89.29]  Diagnosis Additional Information: No value filed.    Anesthesia Type:   General     Note:    Oropharynx: oropharynx clear of all foreign objects and spontaneously breathing  Level of Consciousness: drowsy  Oxygen Supplementation: face mask  Level of Supplemental Oxygen (L/min / FiO2): 4  Independent Airway: airway patency satisfactory and stable  Dentition: dentition unchanged  Vital Signs Stable: post-procedure vital signs reviewed and stable  Report to RN Given: handoff report given  Patient transferred to: PACU  Comments: 161/110, HR 89, sat 100%, RR 12, T 99.2  Handoff Report: Identifed the Patient, Identified the Reponsible Provider, Reviewed the pertinent medical history, Discussed the surgical course, Reviewed Intra-OP anesthesia mangement and issues during anesthesia, Set expectations for post-procedure period and Allowed opportunity for questions and acknowledgement of understanding      Vitals:  Vitals Value Taken Time   /114 10/11/22 1556   Temp 37.3  C (99.2  F) 10/11/22 1550   Pulse 93 10/11/22 1600   Resp 17 10/11/22 1600   SpO2 99 % 10/11/22 1600   Vitals shown include unvalidated device data.    Electronically Signed By: JARRED Parikh CRNA  October 11, 2022  4:01 PM

## 2022-10-11 NOTE — OP NOTE
PREOPERATIVE DIAGNOSIS:   1. Full-thickness chondral defect medial femoral condyle, 2 x 2 cm  2. Varus malalignment left lower extremity    POSTOPERATIVE DIAGNOSIS:  1. Full-thickness chondral defect medial femoral condyle, 2 x 1.8 cm  2. Varus malalignment left lower extremity    PROCEDURE:  1. Osteochondral allograft transplantation medial femoral condyle left knee  2. Proximal tibial osteotomy    DATE OF SURGERY: 10/11/2022    SURGEON: Ralf Robertson MD    ASSISTANT: Galdino Guzman PA-C. The Assistance of Ms. Guzman was necessary for patient positioning, arthroscopic visualization, retraction, graft preparation and graft passage and proximal tibial osteotomy.     RESIDENT OR FELLOW: Will Martínez MD    OPERATIVE INDICATIONS: Misha Buckner is a pleasant 32 year old who I saw through my orthopedic clinic with a history, physical, imaging consistent with left knee pain.  I previously diagnosed her with a full-thickness chondral defect of the medial femoral condyle.  He failed to see lasting improvement from a staging arthroscopy and I ultimately offered him osteochondral allograft transplantation.  Given his varus malalignment elected to proceed with a proximal tibial osteotomy at the same time..  I reviewed with the patient the risks, benefits, complications, techniques and alternatives to surgery.  We reviewed the expected course of recovery and the potential expected outcomes.  The patient understood both the risks and benefits and desired to proceed despite the risks.    OPERATIVE DETAILS: In the preoperative area the patient's informed consent was reviewed and they desired to proceed.  The left leg was marked and the patient was in agreement.  The patient was taken to the operating room where a timeout was performed and all parties were in agreement.  Preoperative antibiotics were given within 1 hour of the time of incision.  The patient was placed in the supine position and surrendered to LMA anesthesia.   No tourniquet was applied.  Egg crate was placed beneath the well leg and a side post was utilized.  The operative leg was prepped and draped in the usual sterile fashion.     Examination Under Anesthesia:    Range of motion was 0 to 135 degrees.  Stable to varus valgus stress testing.  Stable anterior and posterior drawer testing.  No pivot shift.  Lachman 0.    At this time a medial parapatellar arthrotomy was completed through a midline skin incision.  This was carried down through the skin and subcutaneous tissues.  Meticulous hemostasis was insured.  Retractors were placed allowing excellent exposure of the medial femoral condyle.  This time we brought up our sizing guides.  I first placed a 20 mm sizing guide I did not feel that the coverage was quite adequate so we advanced a 22.5 mm sizing guide.  I felt the coverage was excellent with normal tibial cartilage and normal surrounding cartilage.  A guidepin was placed in the middle of the femoral condyle.  A 20 mm coring reamer was then used and I confirmed that I wanted a 22.5 mm osteochondral allograft.  We then scored with a 22.5.  We reamed to a depth of 9 N., 8 E., 9 S., 9 W.  All extra bone was removed.  Copious irrigation was performed.  Drilling was performed at the base.  Dilating was then performed.    At this time we turned our attention to the graft preparation.  To do this we selected a corresponding section of osteochondral allograft to the 22.5 mm sizing guide was used.  We circumscribed our potential graft.  And the graft was secured to the jig.  The coring reamer was utilized and Joanna cord a 22.5 mm osteochondral allograft.  The graft was then cut to the correct length and cut with an oscillating saw.  The edges were chamfered.  Copious irrigation was then performed with a Pulsavac lavage.  After confirming that the dust was correct.  The graft was brought up in the field and gently malleted into place.  Anatomic reduction.  No step-offs.   Good stability to gentle probing.  Excellent coverage was noted and no further areas of cartilage wear were appreciated.    This time we completed our closure with 1 Vicryl suture of the medial parapatellar arthrotomy.    We continued our skin incision distally through the skin and 70s tissues on the anteromedial tibia.  We then open the fascia full-thickness down to bone with an transverse incision along the distal aspect of the patellar tendon.  We reflected a few millimeters of the patellar tendon insertion from the tibial tubercle.  A suture was placed to allow retraction and we confirmed that we can get to the back of the tibia with our retractor.  Both a Corona elevator then a curved Crego retractor was placed around the back of the tibia.  We then brought in C arm fluoroscopic control and a perfect lateral x-ray was obtained.  Based on our measurements preoperatively plan for 7 degree correction I thought it would be a large implant.  Under perfect lateral radiograph the jig was assembled we confirmed that all 3 prongs were down to bone and the patellar protection arm was deep to the patellar tendon.  This was then drilled and placed.  We confirmed that it was a size large and the second pin was placed.  Under fluoroscopic control we then drilled our hinge pin hole a to P through the anterior lateral tibia.  The keyhole reamer was then assembled and used to drill the anteromedial tibia and the cutting guide was introduced.  The neurovascular shield was placed in her osteotomy was performed with an oscillating saw.  This was finalized with an osteotome under fluoroscopic control.  A preoperative plan was for a 7 degree correction and a large implant.  We gently opened across the osteotomy site until we reached 7 degrees.  Perfect right radiographs of the hip and ankle were then obtained with an alignment anahi in place and a weightbearing axis is was seen to fall to the lateral tibial spine.  We elected to proceed  with a 7 implant.  Bone grafting with 5 cc of demineralized bone matrix and autogenous bone was then placed into the osteotomy site.  At this time gentle opening was performed 1 more millimeter to allow placement of the implant.  It was gently held in place with digital pressure with a spreading arm was removed.  And 2 unicortical screws were placed proximally and 2 4.5 mm bicortical screws were placed distally.  Final radiographs were obtained showing good position of the implant.  No fracture lateral cortex.    Copious irrigation was performed an a layered closure was initiated, sterile dressings were applied and the patient was transferred to the recovery room in stable condition with stable vital signs.    ESTIMATED BLOOD LOSS: 100 mL.    TOURNIQUET TIME: No tourniquet was placed.    COMPLICATIONS: None apparent.    DRAINS: None.    SPECIMENS: None.     POSTOPERATIVE PLAN:  1. Toe-touch weightbearing x6 weeks  2. Range of motion as tolerated  3. Hinged knee brace on and locked when up and around, off or unlocked for sitting or in therapy  4. CPM 0-30 advance as tolerated to goal 0 to 102 hours 3 times daily for 1 week then 2 hours twice daily until 3 weeks.  Certainly use while in hospital.  Patient did express to me some concerns about using this as an outpatient and if it is not possible to we can forego the use of the CPM machine  5. Aspirin 162 mg daily x4 weeks for DVT prophylaxis  6. Patient will be admitted to the hospital.  He can discharge in the hospital his pain is controlled on oral pain medicines and clears physical therapy

## 2022-10-11 NOTE — ANESTHESIA PROCEDURE NOTES
Adductor canal Procedure Note    Pre-Procedure   Staff -        Anesthesiologist:  Ze Pelayo MD       Resident/Fellow: Alex Gary DO       Performed By: fellow       Location: pre-op       Procedure Start/Stop Times: 10/11/2022 10:50 AM and 10/11/2022 11:00 AM       Pre-Anesthestic Checklist: patient identified, IV checked, site marked, risks and benefits discussed, informed consent, monitors and equipment checked, pre-op evaluation, at physician/surgeon's request and post-op pain management  Timeout:       Correct Patient: Yes        Correct Procedure: Yes        Correct Site: Yes        Correct Position: Yes        Correct Laterality: Yes        Site Marked: Yes  Procedure Documentation  Procedure: Adductor canal       Diagnosis: POST-OP PAIN       Laterality: left       Patient Position: supine       Patient Prep/Sterile Barriers: sterile gloves, mask       Skin prep: Chloraprep       Needle Type: short bevel       Needle Gauge: 21.        Needle Length (millimeters): 100        Ultrasound guided       1. Ultrasound was used to identify targeted nerve, plexus, vascular marker, or fascial plane and place a needle adjacent to it in real-time.       2. Ultrasound was used to visualize the spread of anesthetic in close proximity to the above referenced structure.       3. A permanent image is entered into the patient's record.       4. The visualized anatomic structures appeared normal.       5. There were no apparent abnormal pathologic findings.    Assessment/Narrative         The placement was negative for: blood aspirated, painful injection and site bleeding       Paresthesias: No.       Bolus given via needle..        Secured via.        Insertion/Infusion Method: Single Shot       Complications: none       Injection made incrementally with aspirations every 5 mL.    Medication(s) Administered   Bupivacaine 0.25% PF (Infiltration) - Infiltration   20 mL - 10/11/2022 10:50:00  AM  Dexmedetomidine 4 mcg/mL (Perineural) - Perineural   20 mcg - 10/11/2022 10:50:00 AM  Dexamethasone 10 mg/mL PF (Perineural) - Perineural   1 mg - 10/11/2022 10:50:00 AM  Medication Administration Time: 10/11/2022 10:50 AM

## 2022-10-11 NOTE — INTERVAL H&P NOTE
"I have reviewed the surgical (or preoperative) H&P that is linked to this encounter, and examined the patient. There are no significant changes    Clinical Conditions Present on Arrival:  Clinically Significant Risk Factors Present on Admission            # Hypercalcemia: Ca = N/A and/or iCa = N/A on admission, will monitor as appropriate        # Obesity: Estimated body mass index is 32.61 kg/m  as calculated from the following:    Height as of this encounter: 1.803 m (5' 10.98\").    Weight as of this encounter: 106 kg (233 lb 11 oz).       "

## 2022-10-11 NOTE — OR NURSING
PACU to Inpatient Nursing Handoff    Patient Misha Buckner is a 32 year old male who speaks English.   Procedure Procedure(s):  Examination under anesthesia left knee, open osteochondral allograft transplantation  Proximal tibial osteotomy   Surgeon(s) Primary: Ralf Robertson MD  Assisting: Galdino Guzman PA-C     No Known Allergies    Isolation  [unfilled]     Past Medical History   has no past medical history on file.    Anesthesia General   Dermatome Level     Preop Meds acetaminophen (Tylenol) - time given: 1010  gabapentin (Neurontin) - time given: 1010   Nerve block Adductor canal.  Location:left. Med:bupivacaine. Time given: 10:50   Intraop Meds fentanyl (Sublimaze): 150 mcg total  hydromorphone (Dilaudid): 0.5 mg total  ketorolac (Toradol): last given at 15:15  ondansetron (Zofran): last given at 15:15   Local Meds Yes   Antibiotics cefazolin (Ancef) - last given at 12:52     Pain Patient Currently in Pain:  (sedated)   PACU meds  acetaminophen (Tylenol): 975 mg (total dose) last given at 17:11   fentanyl (Sublimaze): 50 mcg (total dose) last given at 16:26   hydromorphone (Dilaudid): 0.4 mg (total dose) last given at 16:33   labetalol (Normodyne/Trandate): 15 mg (total dose) last given at 17:25   oxycodone (Roxicodone): 5 mg (total dose) last given at 17:11    PCA / epidural No   Capnography     Telemetry ECG Rhythm: Normal sinus rhythm   Inpatient Telemetry Monitor Ordered? No        Labs Glucose Lab Results   Component Value Date     10/11/2022    GLC 99.0 12/13/2011       Hgb Lab Results   Component Value Date    HGB 16.3 09/27/2022    HGB 14.4 12/13/2011       INR No results found for: INR   PACU Imaging Not applicable     Wound/Incision Incision/Surgical Site 10/11/22 Left Knee (Active)   Incision Assessment WDL except 10/11/22 1550   Closure ANALI 10/11/22 1550   Dressing Intervention Clean, dry, intact 10/11/22 1550   Number of days: 0      CMS        Equipment ice pack and  continuous passive motion machine (CPM)   Other LDA       IV Access Peripheral IV 10/11/22 Right Upper forearm (Active)   Site Assessment WDL 10/11/22 1550   Line Status Infusing 10/11/22 1550   Dressing Intervention Padding of hub;New dressing  10/11/22 1034   Phlebitis Scale 0-->no symptoms 10/11/22 1550   Infiltration Scale 0 10/11/22 1550   Number of days: 0      Blood Products Not applicable 0  mL   Intake/Output Date 10/11/22 0700 - 10/12/22 0659   Shift 9687-4020 2213-3851 1197-1376 24 Hour Total   INTAKE   I.V. 733.33 100  833.33   Shift Total(mL/kg) 733.33(6.92) 100(0.94)  833.33(7.86)   OUTPUT   Blood  100  100   Shift Total(mL/kg)  100(0.94)  100(0.94)   Weight (kg) 106 106 106 106      Drains / [place]     Time of void PreOp Void Prior to Procedure: 1007 (10/11/22 1006)    PostOp  not yet    Diapered? No   Bladder Scan     PO    tolerating sips and crackers     Vitals    B/P: (!) 155/105  T: 99.2  F (37.3  C)    Temp src: Axillary  P:  Pulse: 77 (10/11/22 1715)          R: 9  O2:  SpO2: 97 %    O2 Device: Nasal cannula (10/11/22 1100)    Oxygen Delivery: 1 LPM (10/11/22 1700)         Family/support present none   Patient belongings     Patient transported on cart and air mat   DC meds/scripts (obs/outpt) Yes, meds   Inpatient Pain Meds Released? Yes       Special needs/considerations hinged knee brace on, cpm machine brought to bedside   Tasks needing completion None       Wen Khan, SANJAY  ASCOM [id number]

## 2022-10-11 NOTE — ANESTHESIA PROCEDURE NOTES
Airway       Patient location during procedure: OR  Staff -        Anesthesiologist:  Ze Pelayo MD       Resident/Fellow: Timur Quinn MD       Performed By: resident  Consent for Airway        Urgency: elective  Indications and Patient Condition       Indications for airway management: steven-procedural       Induction type:intravenous       Mask difficulty assessment: 0 - not attempted    Final Airway Details       Final airway type: supraglottic airway    Supraglottic Airway Details        Type: LMA       Brand: Ambu AuraGain       LMA size: 5    Post intubation assessment        Placement verified by: capnometry, equal breath sounds and chest rise        Number of attempts at approach: 1       Number of other approaches attempted: 0       Secured with: pink tape       Ease of procedure: easy       Dentition: Intact and Unchanged

## 2022-10-11 NOTE — PHARMACY-ADMISSION MEDICATION HISTORY
Admission Medication History Completed by Pharmacy    See [facility] Admission Navigator for allergy information, preferred outpatient pharmacy, prior to admission medications and immunization status.     Medication History Sources:     Patient    Changes made to PTA medication list (reason):    Added: None    Deleted: None    Changed: None    Additional Information:    None    Prior to Admission medications    Medication Sig Last Dose Taking? Auth Provider Long Term End Date   acetaminophen (TYLENOL) 325 MG tablet Take 2 tablets (650 mg) by mouth every 4 hours as needed for other (mild pain)  Yes Galdino Guzman PA-C     amLODIPine (NORVASC) 10 MG tablet Take 1 tablet (10 mg) by mouth daily 10/11/2022 at 0800 Yes Reported, Patient Yes    aspirin 81 MG EC tablet Take 1 tablet (81 mg) by mouth 2 times daily  Yes Galdino Guzman PA-C     hydrOXYzine (ATARAX) 25 MG tablet Take 1 tablet (25 mg) by mouth every 6 hours as needed for itching or anxiety (with pain, moderate pain)  Yes Galdino Guzman PA-C     oxyCODONE (ROXICODONE) 5 MG tablet Take 1-2 tablets (5-10 mg) by mouth every 4 hours as needed for moderate to severe pain  Yes Galdino Guzman PA-C     senna-docusate (SENOKOT-S/PERICOLACE) 8.6-50 MG tablet Take 1-2 tablets by mouth 2 times daily Take while on oral narcotics to prevent or treat constipation.  Yes Galdino Guzman PA-C     acetaminophen (TYLENOL) 325 MG tablet Take 2 tablets (650 mg) by mouth every 4 hours as needed for mild pain   Galdino Guzman PA-C     oxyCODONE (ROXICODONE) 5 MG tablet Take 1-2 tablets (5-10 mg) by mouth every 4 hours as needed for moderate to severe pain   Galdino Guzman PA-C     senna-docusate (SENOKOT-S/PERICOLACE) 8.6-50 MG tablet Take 1-2 tablets by mouth 2 times daily   Galdino Guzman PA-C         Date completed: 10/11/22    Medication history completed by: GORDON GALEANA [facility]

## 2022-10-12 ENCOUNTER — APPOINTMENT (OUTPATIENT)
Dept: PHYSICAL THERAPY | Facility: CLINIC | Age: 32
End: 2022-10-12
Attending: PHYSICIAN ASSISTANT
Payer: COMMERCIAL

## 2022-10-12 ENCOUNTER — MEDICAL CORRESPONDENCE (OUTPATIENT)
Dept: HEALTH INFORMATION MANAGEMENT | Facility: CLINIC | Age: 32
End: 2022-10-12

## 2022-10-12 LAB
FASTING STATUS PATIENT QL REPORTED: YES
GLUCOSE BLD-MCNC: 105 MG/DL (ref 70–99)
HGB BLD-MCNC: 13.7 G/DL (ref 13.3–17.7)

## 2022-10-12 PROCEDURE — 85018 HEMOGLOBIN: CPT | Performed by: PHYSICIAN ASSISTANT

## 2022-10-12 PROCEDURE — 250N000011 HC RX IP 250 OP 636: Performed by: PHYSICIAN ASSISTANT

## 2022-10-12 PROCEDURE — 36415 COLL VENOUS BLD VENIPUNCTURE: CPT | Performed by: ORTHOPAEDIC SURGERY

## 2022-10-12 PROCEDURE — 250N000013 HC RX MED GY IP 250 OP 250 PS 637: Performed by: PHYSICIAN ASSISTANT

## 2022-10-12 PROCEDURE — 97161 PT EVAL LOW COMPLEX 20 MIN: CPT | Mod: GP

## 2022-10-12 PROCEDURE — 82947 ASSAY GLUCOSE BLOOD QUANT: CPT | Performed by: ORTHOPAEDIC SURGERY

## 2022-10-12 PROCEDURE — 999N000111 HC STATISTIC OT IP EVAL DEFER

## 2022-10-12 PROCEDURE — 97116 GAIT TRAINING THERAPY: CPT | Mod: GP

## 2022-10-12 RX ORDER — ACETAMINOPHEN 325 MG/1
650 TABLET ORAL EVERY 4 HOURS PRN
Qty: 60 TABLET | Refills: 0 | Status: SHIPPED | OUTPATIENT
Start: 2022-10-14 | End: 2022-10-12

## 2022-10-12 RX ORDER — AMOXICILLIN 250 MG
1 CAPSULE ORAL 2 TIMES DAILY
Qty: 30 TABLET | Refills: 0 | Status: SHIPPED | OUTPATIENT
Start: 2022-10-12 | End: 2022-10-12

## 2022-10-12 RX ORDER — OXYCODONE HYDROCHLORIDE 5 MG/1
5 TABLET ORAL EVERY 4 HOURS PRN
Qty: 30 TABLET | Refills: 0 | Status: SHIPPED | OUTPATIENT
Start: 2022-10-12 | End: 2022-10-12

## 2022-10-12 RX ORDER — POLYETHYLENE GLYCOL 3350 17 G/17G
17 POWDER, FOR SOLUTION ORAL DAILY
Qty: 10 PACKET | Refills: 0 | Status: SHIPPED | OUTPATIENT
Start: 2022-10-12 | End: 2024-01-11

## 2022-10-12 RX ADMIN — AMLODIPINE BESYLATE 10 MG: 10 TABLET ORAL at 08:22

## 2022-10-12 RX ADMIN — ACETAMINOPHEN 975 MG: 325 TABLET, FILM COATED ORAL at 08:21

## 2022-10-12 RX ADMIN — SENNOSIDES AND DOCUSATE SODIUM 1 TABLET: 50; 8.6 TABLET ORAL at 08:22

## 2022-10-12 RX ADMIN — ASPIRIN 81 MG: 81 TABLET, COATED ORAL at 08:22

## 2022-10-12 RX ADMIN — OXYCODONE HYDROCHLORIDE 10 MG: 10 TABLET ORAL at 10:42

## 2022-10-12 RX ADMIN — OXYCODONE HYDROCHLORIDE 5 MG: 5 TABLET ORAL at 01:59

## 2022-10-12 RX ADMIN — ACETAMINOPHEN 975 MG: 325 TABLET, FILM COATED ORAL at 01:53

## 2022-10-12 RX ADMIN — CEFAZOLIN SODIUM 2 G: 2 INJECTION, SOLUTION INTRAVENOUS at 05:04

## 2022-10-12 RX ADMIN — OXYCODONE HYDROCHLORIDE 10 MG: 10 TABLET ORAL at 06:39

## 2022-10-12 ASSESSMENT — ACTIVITIES OF DAILY LIVING (ADL)
ADLS_ACUITY_SCORE: 36
ADLS_ACUITY_SCORE: 32
ADLS_ACUITY_SCORE: 36
ADLS_ACUITY_SCORE: 36
ADLS_ACUITY_SCORE: 31
ADLS_ACUITY_SCORE: 36

## 2022-10-12 NOTE — PLAN OF CARE
"  VS: /69 (BP Location: Left arm, Patient Position: Semi-Dewitt's, Cuff Size: Adult Regular)   Pulse 64   Temp (!) 95.6  F (35.3  C) (Oral)   Resp 18   Ht 1.803 m (5' 10.98\")   Wt 106 kg (233 lb 11 oz)   SpO2 95%   BMI 32.61 kg/m      O2: Room air>90 lungs are clear   Output: Voids adequately without difficulties - pt walked to the bathroom   Last BM: 10/12   Activity: Toe touch bearing  With walker and gait belt   Skin: Left knee surgical incision    Pain: 7/10- gave oxycodone x2   CMS: Intact, AXO X4, denies numbness and tinging   dressing Left knee dressing -UTV   Diet: regular   LDA: Hinge Knee brace, R piv -sl   Equipment: aditi Urena, iv pole/[ump   Plan: TBD   Additional Info:                             "

## 2022-10-12 NOTE — PLAN OF CARE
Physical Therapy Discharge Summary    Reason for therapy discharge:    Discharged to home with outpatient therapy.    Progress towards therapy goal(s). See goals on Care Plan in [facility] electronic health record for goal details.  Goals met    Therapy recommendation(s):    Continued therapy is recommended.  Rationale/Recommendations:  Recommend pt continue with OP PT to progress functional mobility, LE strength and ROM.

## 2022-10-12 NOTE — PLAN OF CARE
"  VS: /69 (BP Location: Left arm, Patient Position: Semi-Dewitt's, Cuff Size: Adult Regular)   Pulse 64   Temp (!) 95.6  F (35.3  C) (Oral)   Resp 18   Ht 1.803 m (5' 10.98\")   Wt 106 kg (233 lb 11 oz)   SpO2 95%   BMI 32.61 kg/m    Pt denies chest pain.    O2: >90% on RA. Lung sounds clear and equal bilaterally.    Output: Voids spontaneously without difficulty   Last BM: 10/11/22 per pt report   Activity: Ax1 with walker and GB. TTWB to LLE. Ambulated to bathroom x1 overnight.    Up for meals? N/A   Skin: Visible skin intact except for L knee incision. Pt declined full skin assessment.    Pain: Managed with scheduled Tylenol, PRN Oxycodone, IV Dilaudid and Ice packs.    CMS: Intact, pt denies numbness and tingling. A&O x4   Dressing: LLE - CDI   Diet: Regular   LDA: PIV R forearm - infusing   Equipment: IV pole/pump, CAPNO, walker, GB, and personal belongings.    Plan: TBD   Additional Info: Hinge brace on while in bed or up walking.   Pt has CPM machine in room to be used 4-6hrs daily per orders.        Patient vital signs are at baseline: Yes  Patient able to ambulate as they were prior to admission or with assist devices provided by therapies during their stay:  Yes  Patient MUST void prior to discharge:  Yes  Patient able to tolerate oral intake:  Yes  Pain has adequate pain control using Oral analgesics:  NO Pt had IV Dilaudid x1 overnight  Does patient have an identified :  Yes  Has goal D/C date and time been discussed with patient:  Yes      "

## 2022-10-12 NOTE — PROGRESS NOTES
Pt arrived on unit at 1830. Pt was settled and oriented to the room. Pt is A&Ox4. Call light within reach and utilized appropriately. Continue to monitor.

## 2022-10-12 NOTE — DISCHARGE SUMMARY
DISCHARGE SUMMARY    Pt discharging to: home  Transportation: friend via private vehicle  AVS given and discussed: yes   Stoplight Tool given and discussed: yes   Medications given: yes   Belongings returned: yes   Comments:

## 2022-10-12 NOTE — PLAN OF CARE
"VS: BP (!) 144/88 (BP Location: Left arm, Cuff Size: Adult Regular)   Pulse 59   Temp (!) 95.9  F (35.5  C)   Resp 16   Ht 1.803 m (5' 10.98\")   Wt 106 kg (233 lb 11 oz)   SpO2 95%   BMI 32.61 kg/m      O2: Sating >90% on RA. Lung sounds clear. Denies chest pain and SOB.   Output: Voids spontaneously and adequately.    Last BM: 10/12/22. Bowel sounds active x4. Passing flatus.    Activity: Up with 1 assist, FWW, and gait belt. TTWB to LLE. Hinged knee brace on continuously.    Up for meals? Yes    Skin: Visible skin intact, refused full skin assessment. L knee incision.    Pain: Pain was managed with PRN Oxycodone and ice.    CMS: A&Ox4. Denies N/T.    Dressing: Dressing to L knee C/D/I.   Diet: Regular. Appetite was good. Denies N/V.    LDA: PIV was removed prior to discharge.    Equipment: IV pole, FWW, gait belt, HKB, and personal belongings.    Plan: Continue to monitor. Call light within reach and utilized appropriately. Pt discharged home at 1100.   Additional Info:       Pt Obs goals::  ~ Patient vital signs are at baseline: met    ~ Patient able to ambulate as they were prior to admission or with assist devices provided by therapies during their stay: met   ~ Patient MUST void prior to discharge: met    ~ Patient able to tolerate oral intake to maintain hydration status: met    ~ Patient has adequate pain control using Oral analgesics: met    ~ Hypercapnia, hypoventilation or hypoxia resolved for at least 2 hours without supplemental oxygen: met    ~ Deficits in sensation, mobility or coordination have resolved if spinal or regional anesthesia was used: met    ~ Patient has returned to baseline mental status: met    "

## 2022-10-12 NOTE — PROGRESS NOTES
"Orthopaedic Surgery Progress Note     10/12/022    E: No acute events overnight.    S: Pain well controlled, comfortable    O:   BP (!) 144/88 (BP Location: Left arm, Cuff Size: Adult Regular)   Pulse 59   Temp (!) 95.9  F (35.5  C)   Resp 16   Ht 1.803 m (5' 10.98\")   Wt 106 kg (233 lb 11 oz)   SpO2 95%   BMI 32.61 kg/m        Exam:  Gen: NAD, A/O x 3  Resp: Comfortable, non-labored breathing    RLE:  -Wound dressed, c/d/i  -Sens: SILT dp/sp/s/s/t  -Motor: 5/5 EHL/FHL/TA/GSc  -Vasc: 2+ pulses, wwp, brisk cap refill    Recent Labs   Lab 10/12/22  0757 10/11/22  0945   * 110*     Recent Labs   Lab 10/12/22  0757   HGB 13.7     No lab results found in last 7 days.      1. Impression: 32 year old male s/p Osteochondral allograft transplantation medial femoral condyle left knee and Proximal tibial osteotomy on 10/11/2022, doing well    Plan:    2. Toe-touch weightbearing x6 weeks  3. Range of motion as tolerated  4. Hinged knee brace on and locked when up and around, off or unlocked for sitting or in therapy  5. CPM 0-30 advance as tolerated to goal 0 to 102 hours 3 times daily for 1 week then 2 hours twice daily until 3 weeks.  Certainly use while in hospital.  Patient did express to me some concerns about using this as an outpatient and if it is not possible to we can forego the use of the CPM machine  6. Aspirin 162 mg daily x4 weeks for DVT prophylaxis  7. Patient will be admitted to the hospital.  He can discharge in the hospital his pain is controlled on oral pain medicines and clears physical therapy             Future Appointments   Date Time Provider Department Center   10/12/2022  1:45 PM Marisol Jerez OT [facility] [place]   10/14/2022  9:00 AM Jeyson Madera PT [facility]   10/20/2022  9:00 AM Jeyson Madera PT [facility]   10/27/2022  9:00 AM Jeyson Madera PT [facility]   11/3/2022  9:00 AM Jeyson Madera, PT [facility]   11/10/2022  9:00 AM Jeyson Madera, PT [facility]        Jeannie" Leo STERN [facility]  Department of Orthopedic Surgery  [facility]  [phone]    For any questions regarding this patient please page me at the above number prior to contacting the ortho resident on call.

## 2022-10-12 NOTE — PLAN OF CARE
Occupational Therapy: Orders received. Chart reviewed and discussed with care team.? Occupational Therapy not indicated due to pt able to IND dress self. Stated did not have concerns with showering, has walk in shower, or toileting. Pt has had multiple procedures prior, familiar with precautions.? Defer discharge recommendations to physical therapy.? Will complete orders.

## 2022-10-12 NOTE — PROGRESS NOTES
"   10/12/22 0936   Appointment Info   Signing Clinician's Name / Credentials (PT) Tari Gary DPT   Living Environment   People in Home other relative(s)  (aunt)   Current Living Arrangements house   Home Accessibility stairs to enter home   Number of Stairs, Main Entrance 9   Stair Railings, Main Entrance railing on left side (ascending)   Living Environment Comments 6+ 3 SCOUT 1 side railing. Pt lives with aunt, has a sister that lives nearby   Self-Care   Usual Activity Tolerance good   Current Activity Tolerance good   Regular Exercise No   Equipment Currently Used at Home crutches   Activity/Exercise/Self-Care Comment Pt IND previously in ADLs, amb w/ crutches for month and half prior to current surgery d/t previous knee surgery   General Information   Onset of Illness/Injury or Date of Surgery 10/11/22   Referring Physician Galdino Guzman PA-C   Patient/Family Therapy Goals Statement (PT) To return home   Pertinent History of Current Problem (include personal factors and/or comorbidities that impact the POC) Per EMR pt s/p \"left knee, open osteochondral allograft transplantation  Proximal tibial osteotomy   Existing Precautions/Restrictions weight bearing   Weight-Bearing Status - LLE toe touch weight-bearing   Cognition   Affect/Mental Status (Cognition) WFL   Orientation Status (Cognition) oriented x 4   Pain Assessment   Patient Currently in Pain Yes, see Vital Sign flowsheet   Integumentary/Edema   Integumentary/Edema Comments Pt L LE incision not fully visualized d/t bandagin   Posture    Posture Not impaired   Range of Motion (ROM)   ROM Comment L knee ROM limited 2/2 pain   Strength (Manual Muscle Testing)   Strength Comments L LE strength impaired 2/2 pain , demonstrates some antigravity quad activation   Bed Mobility   Comment, (Bed Mobility) supine<>sit IND w/ HKB   Transfers   Comment, (Transfers) Pt STS Flavio w/ crutches   Gait/Stairs (Locomotion)   Distance in Feet (Gait) 150'x2   Comment, " (Gait/Stairs) Pt amb and navigates stairs Flavio with crutches   Balance   Balance Comments Pt requires B UE support on crutches for dynamic standing balance d/t wb restrictions narrowing CELSO posing balance challenge   Sensory Examination   Sensory Perception patient reports no sensory changes   Coordination   Coordination no deficits were identified   Muscle Tone   Muscle Tone no deficits were identified   Clinical Impression   Criteria for Skilled Therapeutic Intervention Yes, treatment indicated   PT Diagnosis (PT) Impaired functional mobility s/p knee surgery   Influenced by the following impairments new WB and ROM limitations on L LE   Functional limitations due to impairments amb, stairs   Clinical Presentation (PT Evaluation Complexity) Stable/Uncomplicated   Clinical Presentation Rationale Pt with few comorbidities, predictable course of recovery   Clinical Decision Making (Complexity) low complexity   Planned Therapy Interventions (PT) gait training;strengthening;stair training;ROM (range of motion);prosthetic fitting/training   Risk & Benefits of therapy have been explained evaluation/treatment results reviewed;care plan/treatment goals reviewed;risks/benefits reviewed;current/potential barriers reviewed;participants voiced agreement with care plan;participants included;patient   PT Total Evaluation Time   PT Eval, Low Complexity Minutes (37343) 10   Plan of Care Review   Plan of Care Reviewed With patient   Physical Therapy Goals   PT Frequency One time eval and treatment only   PT Goals Gait;Stairs   PT: Gait Modified independent;150 feet;Within precautions;Crutches   PT: Stairs Modified independent;9 stairs;Assistive device   Gait Training   Gait Training Minutes (95736) 15   Symptoms Noted During/After Treatment (Gait Training) none   Treatment Detail/Skilled Intervention PT: facilaited amb w/ crutches 150'x2 pt adheres well to TTWB restrictions and improves in gait speed throughout tx to Flavio.  Facilaited stairs training, pt demonstrates with alternate sequencing- educated pt on correct sequencing pt Flavio 4 steps without difficulty,   PT Discharge Planning   PT Plan dc from PT   PT Discharge Recommendation (DC Rec) home with assist   PT Rationale for DC Rec Following session pt compeltes all functional mobiltiy goals for faciliating safe dc/   PT Brief overview of current status Flavio with crutches/FWW   Total Session Time   Timed Code Treatment Minutes 15   Total Session Time (sum of timed and untimed services) 25       M [facility]  OUTPATIENT PHYSICAL THERAPY EVALUATION  PLAN OF TREATMENT FOR OUTPATIENT REHABILITATION  (COMPLETE FOR INITIAL CLAIMS ONLY)  Patient's Last Name, First Name, M.I.  YOB: 1990  Misha Buckner                        Provider's Name  [facility] Medical Record No.  [id number]                             Onset Date:  10/11/22   Start of Care Date:      Type:     _X_PT   ___OT   ___SLP Medical Diagnosis:                 PT Diagnosis:  Impaired functional mobility s/p knee surgery Visits from SOC:  1     See note for plan of treatment, functional goals and certification details    I CERTIFY THE NEED FOR THESE SERVICES FURNISHED UNDER        THIS PLAN OF TREATMENT AND WHILE UNDER MY CARE     (Physician co-signature of this document indicates review and certification of the therapy plan).

## 2022-10-13 NOTE — PROGRESS NOTES
Aydee Womack Patient Age: 61 year old  MESSAGE:   Caller: Patient    Reason for Call: Symptom/ Medication Change Request or Issue / Med Auth Forms-  Symptoms or Medication Change Request     Description of concern: Patient's guardian went to visit her at the [facility] where she lives and says patient seems incoherent and like a \"walking zombie\". Patient was sleeping in chair when guardian arrived, and it took a full 10 minutes to get the patient to respond and realize who she was talking to.   Nurses at facility say the patient used to want to help out with tasks, but she is no longer doing that.  Last time guardian saw patient was mid-March and patient was doing better at that time.Guardian thinks patient's meds need to be adjusted.      Could a sooner visit be done for patient? Guardian could do a visit later today (needs to drive an hour to get there) or she can do next week when she's back from out of town.    Summary Concern(s) for Patient: Other - Patient is less responsive      ALL CALLERS: Can you give us some specific examples of how this issue is impacting your life right now?  See above       In the last two weeks, have you had any thoughts of self-harm or harming others?  NO      Are there any recent troublesome events or triggers (break-up/divorce, job/school changes, etc.) that could be impacting your mental health? - None      Changes in sleeping or eating patterns?  No    Recent medication changes? If so, how long ago?   Cogentin was discontinued last visit    Missed medications? (For RN Staff: REVIEW each medication and the dosage, frequency)  No    Suggestions on what you think will help you (your child)?  Sooner visit    At what number can you be reached?   [phone]      Date of patient's next appointment (if none booked, [facility] One):   6/1    Will Route Message to PROVIDER'S CLINICAL POOL: Yes      Provider Is: In the Office Today       WEIGHT AND HEIGHT:   Wt Readings from  Physical Therapy Initial Evaluation: Subjective History  Date of Surgery: 10/11/22.    Surgical Procedure/Limb: Left osteochondral allograft transplantation medial femoral condyle left knee, proximal tibial osteotomy   Surgeon Name: Dr. Robertson  Precautions/Restrictions:   TTWB x6 weeks  Hinged knee brace on and locked when up and around    Average Daily Pain Levels: 8/10 (Location: anterior knee; Quality: Sharp, Aching/Throbbing and Tender)  Other Symptoms: occasional tingling into lower leg  Symptom Mgmt Strategies: ice, elevation, compression, rest    Prior orthopaedic history/procedures: L knee arthroscopy 6/7/22  Prior non-operative management: PT    Functional limitations following procedure: gait, transfers, stairs  Previous level of function: independent with ADL's with knee pain, unable to work secondary to pain    Patient Employment: unemployed- was a   Desired Physical Activity (Goals): work full time, play recreational sports       Knee Evaluation    DVT Assessment: Wells Criteria- bold indicates present   Tenderness in Calf Leg pain    Pitting Edema  Red and Hot Skin    Fever  Calf/Ankle Swelling     Incision Observation: Covered by Steri-strips    Gait: 2 crutches and NWBing  Transfers: independent onto plinth    Knee ROM (degrees) Extension Flexion   Left 10 from neutral 50   Right 4 hyperext 120       Edema: (cm)  Joint Line 15cm above   Left 49 58   Right 41 53    Stroke test for knee effusion: 3+      Knee MMT Quadriceps set Straight Leg Raise   Left Poor Unable   Right Good Good        Assessment/Plan:  Patient is a 32 year old male with left side knee complaints.    Patient has the following significant findings with corresponding treatment plan.                Diagnosis 1:  S/p knee surgery  Pain -  hot/cold therapy, electric stimulation, mechanical traction, manual therapy, splint/taping/bracing/orthotics, self management, education and home program  Decreased ROM/flexibility -  Last 1 Encounters:   11/30/21 72.6 kg (160 lb)     Ht Readings from Last 1 Encounters:   11/30/21 5' 2.5\" (1.588 m)     BMI Readings from Last 1 Encounters:   11/30/21 28.80 kg/m²       ALLERGIES:  Sulfamethoxazole-trimethoprim  Current Outpatient Medications   Medication   • clonazePAM (KlonoPIN) 0.5 MG tablet   • perphenazine 4 MG tablet   • divalproex (DEPAKOTE) 500 MG delayed release EC tablet   • QUEtiapine (SEROquel) 50 MG tablet   • levOCARNitine (CARNITOR) 330 MG tablet   • folic acid (FOLATE) 1 MG tablet   • Multiple Vitamin (THEREMS PO)   • acetaminophen (TYLENOL) 325 MG tablet   • Alum & Mag Hydroxide-Simeth (ANTACID/ANTI-GAS PO)   • Magnesium Hydroxide (MILK OF MAGNESIA PO)     No current facility-administered medications for this visit.           PCP: Whit Brandon MD         INS: Payor: [employer] / Plan:  FRANCOIS HI SPM30 / Product Type: YOAN IRVIN   PATIENT ADDRESS:  [address] [place] [address]   manual therapy, therapeutic exercise and home program  Decreased joint mobility - manual therapy, therapeutic exercise and home program  Decreased strength - therapeutic exercise, therapeutic activities and home program  Decreased proprioception - neuro re-education, therapeutic activities and home program  Edema - vasopneumatics, cold therapy and self management/home program  Impaired gait - gait training and home program  Impaired muscle performance - electric stimulation, neuro re-education and home program  Decreased function - therapeutic activities and home program    Therapy Evaluation Codes:   1) History comprised of:   Personal factors that impact the plan of care:      Time since onset of symptoms.    Comorbidity factors that impact the plan of care are:      Smoking.     Medications impacting care: None.  2) Examination of Body Systems comprised of:   Body structures and functions that impact the plan of care:      Knee.   Activity limitations that impact the plan of care are:      Bending, Driving, Jumping, Squatting/kneeling, Stairs, Standing, Walking, Working and Sleeping.  3) Clinical presentation characteristics are:   Stable/Uncomplicated.  4) Decision-Making    Low complexity using standardized patient assessment instrument and/or measureable assessment of functional outcome.  Cumulative Therapy Evaluation is: Low complexity.    Previous and current functional limitations:  (See Goal Flow Sheet for this information)    Short term and Long term goals: (See Goal Flow Sheet for this information)     Communication ability:  Patient appears to be able to clearly communicate and understand verbal and written communication and follow directions correctly.  Treatment Explanation - The following has been discussed with the patient:   RX ordered/plan of care  Anticipated outcomes  Possible risks and side effects  This patient would benefit from PT intervention to resume normal activities.   Rehab potential  is good.    Frequency:  2 X week, once daily  Duration:  for 4 weeks tapering to 1 X a week over 8 weeks  Discharge Plan:  Achieve all LTG.  Independent in home treatment program.  Reach maximal therapeutic benefit.    Please refer to the daily flowsheet for treatment today, total treatment time and time spent performing 1:1 timed codes.

## 2022-10-14 ENCOUNTER — THERAPY VISIT (OUTPATIENT)
Dept: PHYSICAL THERAPY | Facility: CLINIC | Age: 32
End: 2022-10-14
Payer: COMMERCIAL

## 2022-10-14 DIAGNOSIS — Z47.89 AFTERCARE FOLLOWING SURGERY OF THE MUSCULOSKELETAL SYSTEM: ICD-10-CM

## 2022-10-14 PROCEDURE — 97110 THERAPEUTIC EXERCISES: CPT | Mod: GP

## 2022-10-14 PROCEDURE — 97140 MANUAL THERAPY 1/> REGIONS: CPT | Mod: GP

## 2022-10-14 PROCEDURE — 97530 THERAPEUTIC ACTIVITIES: CPT | Mod: GP

## 2022-10-14 PROCEDURE — 97016 VASOPNEUMATIC DEVICE THERAPY: CPT | Mod: GP

## 2022-10-14 NOTE — PROGRESS NOTES
Patient did not return for further treatment and no additional progress was noted.  Please refer to the progress note and goal flowsheet completed on 09/06/22 for discharge information.

## 2022-10-14 NOTE — PROGRESS NOTES
[facility]    OUTPATIENT Physical Therapy ORTHOPEDIC EVALUATION  PLAN OF TREATMENT FOR OUTPATIENT REHABILITATION  (COMPLETE FOR INITIAL CLAIMS ONLY)  Patient's Last Name, First Name, M.I.  YOB: 1990  Misha Buckner    Provider s Name:  [facility]   Medical Record No.  [id number]   Start of Care Date:  10/14/22   Onset Date:  10/11/22    Treatment Diagnosis:  L Knee OCA, prox tib osteotomy Medical Diagnosis:  Aftercare following surgery of the musculoskeletal system       Goals:     10/14/22 0500   Body Part   Goals listed below are for L knee   Goal #1   Goal #1 ambulation   Previous Functional Level No restrictions   Current Functional Level Minutes patient can walk   Performance Level <5 minutes   STG Target Performance Minutes patient will be able to walk   Performance Level 5 minutes   Rationale for safe household ambulation;for safe outdoor household ambulation;for safe community ambulation;to maintain proper body mechanics/posture while ambulating to avoid additional compensatory injury due to improper gait mechanics;to promote a healthy and active lifestyle   Due Date 11/25/22    LTG Target Performance Minutes patient will be able to  walk   Performance Level 20-30 minutes   Rationale for safe outdoor household ambulation;for safe household ambulation;for safe community ambulation;to maintain proper body mechanics/posture while ambulating to avoid additional compensatory injury due to improper gait mechanics;to promote a healthy and active lifestyle   Due Date 01/14/23   Goal #2   Goal #2 stairs   Current Functional Level Ascend/descend stairs;one step at a time   STG Target Performance Ascend stairs;in a normal reciprocal pattern   Rationale to enter/leave the house safely;to reach lower level of home safely;to reach upper level of home safely;for safe  community access to buildings;for safe community ambulaton   Due Date 12/14/22   LTG Target Performance Ascend/descend stairs;in a normal reciprocal pattern   Rationale to enter/leave the house safely;to reach upper level of home safely;to reach lower level of home safely;for safe community access to buildings;for safe community ambulaton   Due Date 01/14/23       Therapy Frequency:  2x/week for 4 weeks, 1x/week for 8 weeks  Predicted Duration of Therapy Intervention:       Jeyson Madera, PT                 I CERTIFY THE NEED FOR THESE SERVICES FURNISHED UNDER        THIS PLAN OF TREATMENT AND WHILE UNDER MY CARE     (Physician attestation of this document indicates review and certification of the therapy plan).                     Certification Date From:  10/14/22   Certification Date To:  01/14/23    Referring Provider:  Ralf Goodrich*    Initial Assessment        See Epic Evaluation SOC Date: 10/14/22

## 2022-10-20 ENCOUNTER — THERAPY VISIT (OUTPATIENT)
Dept: PHYSICAL THERAPY | Facility: CLINIC | Age: 32
End: 2022-10-20
Payer: COMMERCIAL

## 2022-10-20 DIAGNOSIS — G89.29 CHRONIC PAIN OF LEFT KNEE: ICD-10-CM

## 2022-10-20 DIAGNOSIS — Z47.89 AFTERCARE FOLLOWING SURGERY OF THE MUSCULOSKELETAL SYSTEM: Primary | ICD-10-CM

## 2022-10-20 DIAGNOSIS — M25.562 CHRONIC PAIN OF LEFT KNEE: ICD-10-CM

## 2022-10-20 PROCEDURE — 97112 NEUROMUSCULAR REEDUCATION: CPT | Mod: GP

## 2022-10-20 PROCEDURE — 97140 MANUAL THERAPY 1/> REGIONS: CPT | Mod: GP

## 2022-10-20 PROCEDURE — 97016 VASOPNEUMATIC DEVICE THERAPY: CPT | Mod: GP

## 2022-10-20 PROCEDURE — 97530 THERAPEUTIC ACTIVITIES: CPT | Mod: GP

## 2022-10-20 PROCEDURE — 97110 THERAPEUTIC EXERCISES: CPT | Mod: GP

## 2022-10-25 ENCOUNTER — THERAPY VISIT (OUTPATIENT)
Dept: PHYSICAL THERAPY | Facility: CLINIC | Age: 32
End: 2022-10-25
Payer: COMMERCIAL

## 2022-10-25 DIAGNOSIS — Z47.89 AFTERCARE FOLLOWING SURGERY OF THE MUSCULOSKELETAL SYSTEM: Primary | ICD-10-CM

## 2022-10-25 DIAGNOSIS — M25.562 CHRONIC PAIN OF LEFT KNEE: ICD-10-CM

## 2022-10-25 DIAGNOSIS — G89.29 CHRONIC PAIN OF LEFT KNEE: ICD-10-CM

## 2022-10-25 PROCEDURE — 97016 VASOPNEUMATIC DEVICE THERAPY: CPT | Mod: GP

## 2022-10-25 PROCEDURE — 97140 MANUAL THERAPY 1/> REGIONS: CPT | Mod: GP

## 2022-10-25 PROCEDURE — 97112 NEUROMUSCULAR REEDUCATION: CPT | Mod: GP

## 2022-10-25 PROCEDURE — 97110 THERAPEUTIC EXERCISES: CPT | Mod: GP

## 2022-10-26 ENCOUNTER — OFFICE VISIT (OUTPATIENT)
Dept: ORTHOPEDICS | Facility: CLINIC | Age: 32
End: 2022-10-26
Payer: COMMERCIAL

## 2022-10-26 DIAGNOSIS — M25.562 CHRONIC PAIN OF LEFT KNEE: ICD-10-CM

## 2022-10-26 DIAGNOSIS — Z47.89 AFTERCARE FOLLOWING SURGERY OF THE MUSCULOSKELETAL SYSTEM: Primary | ICD-10-CM

## 2022-10-26 DIAGNOSIS — G89.29 CHRONIC PAIN OF LEFT KNEE: ICD-10-CM

## 2022-10-26 PROCEDURE — 99024 POSTOP FOLLOW-UP VISIT: CPT

## 2022-10-26 RX ORDER — OXYCODONE HYDROCHLORIDE 5 MG/1
5-10 TABLET ORAL EVERY 4 HOURS PRN
Qty: 16 TABLET | Refills: 0 | Status: SHIPPED | OUTPATIENT
Start: 2022-10-26 | End: 2024-01-11

## 2022-10-26 NOTE — PROGRESS NOTES
Chief Complaint:   1. Follow up, DOS 10/11/22 with Dr. Robertson    Procedures:  1. Osteochondral allograft transplantation medial femoral condyle left knee  2. Proximal tibial osteotomy    History:  Misha Buckner is a 32 year old patient s/p above procedures, here for follow up. Has done well since surgery. Endorses 6/10 anterior knee pain, seems to be improving. Taking 2 tabs 5 mg oxycodone daily, tylenol every 6 hours for pain control. Working with PT twice weekly, future visits scheduled. Has been TTWB, no CPM at home. No concerns today.       Exam:     General: Awake, Alert, and oriented. Articulates and communicates with a normal affect     Left Lower Extremity:    Hinged knee brace in place     Incisions well healed without evidence of infection, no erythema, drainage, or wound dehiscence     Normal post-operative effusion and ecchymosis    Range of motion and stability exam not performed    TA/Gsc/EHL/FHL with 5/5 strength    Distal pulses palpable, toes are warm and well perfused     Gait: TTWB with crutches     Imaging:  No new imaging.    Medications:   -Oxycodone: Taking two tabs daily, refilled today   -Tylenol: Taking q 6 hours   -ASA: Taking 162 mg daily as prescribed  -Hydroxyzine: Not taking     Assesment:  Doing well 2 weeks s/p left osteochondral allograft transplantation, proximal tibial osteotomy with Dr. Robertson. Basic wound cares were performed today, I did not appreciate signs of infection. We discussed the plan below, all questions were answered to the best of my ability.     Plan:   -Weight bearing: TTWB x 6 weeks  -Brace: HKB on and locked while up, off or unlocked while sitting or with therapy  -Range of motion as tolerated   -DVT prophylaxis:  mg daily x 4 weeks  -Follow up: 6 weeks with Dr. Marcelo Olson PA-C 10/26/2022 3:03 PM  Orthopedic Surgery          
Yes, during the last year

## 2022-10-26 NOTE — NURSING NOTE
Reason For Visit:   Chief Complaint   Patient presents with     Surgical Followup     2 week pop DOS: 10/11/22 Osteochondral allograft transplantation medial femoral condyle left knee and proximal tibial osteotomy  with Dr. Robertson       There were no vitals taken for this visit.    Pain Assessment  Patient Currently in Pain: Yes  0-10 Pain Scale: 7  Primary Pain Location: Knee (Left)  Pain Descriptors: Throbbing, Shooting, Tender  Alleviating Factors: Pain medication  Aggravating Factors: Movement    Kelli Solis ATC

## 2022-10-26 NOTE — LETTER
Date:October 27, 2022      Provider requested that no letter be sent. Do not send.       [facility]

## 2022-10-26 NOTE — LETTER
10/26/2022         RE: Misha Buckner  [address] [facility] [address]        Dear Colleague,    Thank you for referring your patient, Misha Buckner, to the [facility] [place]. Please see a copy of my visit note below.    Chief Complaint:   1. Follow up, DOS 10/11/22 with Dr. Robertson    Procedures:  1. Osteochondral allograft transplantation medial femoral condyle left knee  2. Proximal tibial osteotomy    History:  Misha Buckner is a 32 year old patient s/p above procedures, here for follow up. Has done well since surgery. Endorses 6/10 anterior knee pain, seems to be improving. Taking 2 tabs 5 mg oxycodone daily, tylenol every 6 hours for pain control. Working with PT twice weekly, future visits scheduled. Has been TTWB, no CPM at home. No concerns today.       Exam:     General: Awake, Alert, and oriented. Articulates and communicates with a normal affect     Left Lower Extremity:    Hinged knee brace in place     Incisions well healed without evidence of infection, no erythema, drainage, or wound dehiscence     Normal post-operative effusion and ecchymosis    Range of motion and stability exam not performed    TA/Gsc/EHL/FHL with 5/5 strength    Distal pulses palpable, toes are warm and well perfused     Gait: TTWB with crutches     Imaging:  No new imaging.    Medications:   -Oxycodone: Taking two tabs daily, refilled today   -Tylenol: Taking q 6 hours   -ASA: Taking 162 mg daily as prescribed  -Hydroxyzine: Not taking     Assesment:  Doing well 2 weeks s/p left osteochondral allograft transplantation, proximal tibial osteotomy with Dr. Robertson. Basic wound cares were performed today, I did not appreciate signs of infection. We discussed the plan below, all questions were answered to the best of my ability.     Plan:   -Weight bearing: TTWB x 6 weeks  -Brace: HKB on and locked while up, off or unlocked while sitting or with therapy  -Range of motion as  tolerated   -DVT prophylaxis:  mg daily x 4 weeks  -Follow up: 6 weeks with Dr. Marcelo Jean PA-C 10/26/2022 3:03 PM  Orthopedic Surgery              Again, thank you for allowing me to participate in the care of your patient.        Sincerely,        ASHLEE JEAN PA-C

## 2022-11-01 VITALS
TEMPERATURE: 95.9 F | HEART RATE: 59 BPM | OXYGEN SATURATION: 95 % | RESPIRATION RATE: 16 BRPM | HEIGHT: 71 IN | BODY MASS INDEX: 32.72 KG/M2 | DIASTOLIC BLOOD PRESSURE: 88 MMHG | WEIGHT: 233.69 LBS | SYSTOLIC BLOOD PRESSURE: 144 MMHG

## 2022-11-03 ENCOUNTER — THERAPY VISIT (OUTPATIENT)
Dept: PHYSICAL THERAPY | Facility: CLINIC | Age: 32
End: 2022-11-03
Payer: COMMERCIAL

## 2022-11-03 DIAGNOSIS — G89.29 CHRONIC PAIN OF LEFT KNEE: ICD-10-CM

## 2022-11-03 DIAGNOSIS — Z47.89 AFTERCARE FOLLOWING SURGERY OF THE MUSCULOSKELETAL SYSTEM: Primary | ICD-10-CM

## 2022-11-03 DIAGNOSIS — M25.562 CHRONIC PAIN OF LEFT KNEE: ICD-10-CM

## 2022-11-03 PROCEDURE — 97112 NEUROMUSCULAR REEDUCATION: CPT | Mod: GP

## 2022-11-03 PROCEDURE — 97110 THERAPEUTIC EXERCISES: CPT | Mod: GP

## 2022-11-03 PROCEDURE — 97016 VASOPNEUMATIC DEVICE THERAPY: CPT | Mod: GP

## 2022-11-04 ENCOUNTER — DOCUMENTATION ONLY (OUTPATIENT)
Dept: ORTHOPEDICS | Facility: CLINIC | Age: 32
End: 2022-11-04

## 2022-11-04 NOTE — PROGRESS NOTES
Type of Form Received: fmla    Form Received (Date) 10/31/22   Form Filled out Yes, date 11/4/22   Placed in provider folder Yes

## 2022-11-10 ENCOUNTER — THERAPY VISIT (OUTPATIENT)
Dept: PHYSICAL THERAPY | Facility: CLINIC | Age: 32
End: 2022-11-10
Payer: COMMERCIAL

## 2022-11-10 DIAGNOSIS — Z47.89 AFTERCARE FOLLOWING SURGERY OF THE MUSCULOSKELETAL SYSTEM: Primary | ICD-10-CM

## 2022-11-10 DIAGNOSIS — M25.562 CHRONIC PAIN OF LEFT KNEE: ICD-10-CM

## 2022-11-10 DIAGNOSIS — G89.29 CHRONIC PAIN OF LEFT KNEE: ICD-10-CM

## 2022-11-10 PROCEDURE — 97140 MANUAL THERAPY 1/> REGIONS: CPT | Mod: GP

## 2022-11-10 PROCEDURE — 97110 THERAPEUTIC EXERCISES: CPT | Mod: GP

## 2022-11-10 PROCEDURE — 97016 VASOPNEUMATIC DEVICE THERAPY: CPT | Mod: GP

## 2022-11-10 NOTE — PROGRESS NOTES
Received Completed forms Yes   Faxed Forms Faxed To: [facility]  Fax Number:  [phone] on 11.8.22   Sent to [facility] (Date) 11.10.22

## 2022-11-15 ENCOUNTER — THERAPY VISIT (OUTPATIENT)
Dept: PHYSICAL THERAPY | Facility: CLINIC | Age: 32
End: 2022-11-15
Payer: COMMERCIAL

## 2022-11-15 DIAGNOSIS — Z47.89 AFTERCARE FOLLOWING SURGERY OF THE MUSCULOSKELETAL SYSTEM: Primary | ICD-10-CM

## 2022-11-15 DIAGNOSIS — M25.562 CHRONIC PAIN OF LEFT KNEE: ICD-10-CM

## 2022-11-15 DIAGNOSIS — G89.29 CHRONIC PAIN OF LEFT KNEE: ICD-10-CM

## 2022-11-15 PROCEDURE — 97016 VASOPNEUMATIC DEVICE THERAPY: CPT | Mod: GP

## 2022-11-15 PROCEDURE — 97110 THERAPEUTIC EXERCISES: CPT | Mod: GP

## 2022-11-15 PROCEDURE — 97112 NEUROMUSCULAR REEDUCATION: CPT | Mod: GP

## 2022-11-15 PROCEDURE — 97140 MANUAL THERAPY 1/> REGIONS: CPT | Mod: GP

## 2022-11-21 ENCOUNTER — OFFICE VISIT (OUTPATIENT)
Dept: ORTHOPEDICS | Facility: CLINIC | Age: 32
End: 2022-11-21
Payer: COMMERCIAL

## 2022-11-21 ENCOUNTER — ANCILLARY PROCEDURE (OUTPATIENT)
Dept: GENERAL RADIOLOGY | Facility: CLINIC | Age: 32
End: 2022-11-21
Attending: ORTHOPAEDIC SURGERY
Payer: COMMERCIAL

## 2022-11-21 ENCOUNTER — HEALTH MAINTENANCE LETTER (OUTPATIENT)
Age: 32
End: 2022-11-21

## 2022-11-21 VITALS — WEIGHT: 233 LBS | BODY MASS INDEX: 32.62 KG/M2 | HEIGHT: 71 IN

## 2022-11-21 DIAGNOSIS — Z98.890 S/P LEFT KNEE SURGERY: Primary | ICD-10-CM

## 2022-11-21 DIAGNOSIS — G89.29 CHRONIC PAIN OF LEFT KNEE: Primary | ICD-10-CM

## 2022-11-21 DIAGNOSIS — M25.562 CHRONIC PAIN OF LEFT KNEE: Primary | ICD-10-CM

## 2022-11-21 DIAGNOSIS — Z98.890 S/P LEFT KNEE SURGERY: ICD-10-CM

## 2022-11-21 PROCEDURE — 99024 POSTOP FOLLOW-UP VISIT: CPT | Performed by: ORTHOPAEDIC SURGERY

## 2022-11-21 PROCEDURE — 73560 X-RAY EXAM OF KNEE 1 OR 2: CPT | Mod: LT | Performed by: RADIOLOGY

## 2022-11-21 NOTE — LETTER
Date:November 22, 2022      Patient was self referred, no letter generated. Do not send.        [facility] Health Information

## 2022-11-21 NOTE — PROGRESS NOTES
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 Osteochondral allograft transplantation medial femoral condyle left knee  2. Proximal tibial osteotomy    History:  Doing well 6 weeks out from surgery.  Pain controlled.  Weaning from oxycodone.  Doing physical therapy.  Observant of restrictions.      Exam:     General: Awake, Alert, and oriented. Articulates and communicates with a normal affect     Left Lower Extremity:    Incisions well healed without evidence of infection, no erythema, drainage, or wound dehiscence     No post-operative effusion or ecchymosis    Range of motion is full     Knee is stable to examination     TA/Gsc/EHL/FHL with 5/5 strength    Distal pulses palpable, toes are warm and well perfused     Imaging:  AP lateral radiographs obtained today show osteotomy in good position with no change in position of the osteotomy or the implants    Assesment:  6 weeks status post proximal tibial osteotomy and osteochondral allograft transplantation of the medial femoral condyle    Plan:   Weightbearing as tolerated left lower extremity  Range of motion as tolerated left lower extremity  Discontinue crutches  Discontinue the brace  No dangerous or at risk activities  Follow-up 6 weeks with repeat AP and lateral radiographs of his left knee  No running, jumping, pounding or dangerous activities

## 2022-11-21 NOTE — LETTER
11/21/2022         RE: Misha Buckner  [address] [facility] [place] [address]        Dear Colleague,    Thank you for referring your patient, Misha Buckner, to the [facility] [place]. Please see a copy of my visit note below.    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 Osteochondral allograft transplantation medial femoral condyle left knee  2. Proximal tibial osteotomy    History:  Doing well 6 weeks out from surgery.  Pain controlled.  Weaning from oxycodone.  Doing physical therapy.  Observant of restrictions.      Exam:     General: Awake, Alert, and oriented. Articulates and communicates with a normal affect     Left Lower Extremity:    Incisions well healed without evidence of infection, no erythema, drainage, or wound dehiscence     No post-operative effusion or ecchymosis    Range of motion is full     Knee is stable to examination     TA/Gsc/EHL/FHL with 5/5 strength    Distal pulses palpable, toes are warm and well perfused     Imaging:  AP lateral radiographs obtained today show osteotomy in good position with no change in position of the osteotomy or the implants    Assesment:  6 weeks status post proximal tibial osteotomy and osteochondral allograft transplantation of the medial femoral condyle    Plan:   Weightbearing as tolerated left lower extremity  Range of motion as tolerated left lower extremity  Discontinue crutches  Discontinue the brace  No dangerous or at risk activities  Follow-up 6 weeks with repeat AP and lateral radiographs of his left knee  No running, jumping, pounding or dangerous activities            Again, thank you for allowing me to participate in the care of your patient.        Sincerely,        Ralf Robertson MD

## 2022-11-21 NOTE — NURSING NOTE
"Reason For Visit:   Chief Complaint   Patient presents with     Left Knee - RECHECK     DOS 10/11/2022 left knee osteochondral allograft transplantation to medial femoral condyle and proximal tibial osteotomy.      ?  No  Date of surgery: 10/11/2022  Type of surgery:   PROCEDURE:  1. Osteochondral allograft transplantation medial femoral condyle left knee  2. Proximal tibial osteotomy    Patient reports that his recovery from left knee surgery is going well. Patient has continued with physical therapy. He does not that he will experience sharp shooting pain at intermittent times. These times can be random or the following day after physical therapy. He has continued with medication prescribed at the time of surgery.     SANE Score  Left Knee: 20%  Right Knee: 90%    Ht 1.803 m (5' 10.98\")   Wt 105.7 kg (233 lb)   BMI 32.52 kg/m         No Known Allergies    Current Outpatient Medications   Medication     acetaminophen (TYLENOL) 325 MG tablet     amLODIPine (NORVASC) 10 MG tablet     aspirin 81 MG EC tablet     hydrOXYzine (ATARAX) 25 MG tablet     oxyCODONE (ROXICODONE) 5 MG tablet     polyethylene glycol (MIRALAX) 17 g packet     senna-docusate (SENOKOT-S/PERICOLACE) 8.6-50 MG tablet     No current facility-administered medications for this visit.         Shani Adamson, ATC    "

## 2022-11-23 ENCOUNTER — THERAPY VISIT (OUTPATIENT)
Dept: PHYSICAL THERAPY | Facility: CLINIC | Age: 32
End: 2022-11-23
Payer: COMMERCIAL

## 2022-11-23 DIAGNOSIS — M25.562 CHRONIC PAIN OF LEFT KNEE: ICD-10-CM

## 2022-11-23 DIAGNOSIS — Z47.89 AFTERCARE FOLLOWING SURGERY OF THE MUSCULOSKELETAL SYSTEM: Primary | ICD-10-CM

## 2022-11-23 DIAGNOSIS — G89.29 CHRONIC PAIN OF LEFT KNEE: ICD-10-CM

## 2022-11-23 PROCEDURE — 97110 THERAPEUTIC EXERCISES: CPT | Mod: GP

## 2022-11-23 PROCEDURE — 97016 VASOPNEUMATIC DEVICE THERAPY: CPT | Mod: GP

## 2022-11-23 PROCEDURE — 97530 THERAPEUTIC ACTIVITIES: CPT | Mod: GP

## 2022-12-01 ENCOUNTER — THERAPY VISIT (OUTPATIENT)
Dept: PHYSICAL THERAPY | Facility: CLINIC | Age: 32
End: 2022-12-01
Payer: COMMERCIAL

## 2022-12-01 DIAGNOSIS — G89.29 CHRONIC PAIN OF LEFT KNEE: ICD-10-CM

## 2022-12-01 DIAGNOSIS — M25.562 CHRONIC PAIN OF LEFT KNEE: ICD-10-CM

## 2022-12-01 DIAGNOSIS — Z47.89 AFTERCARE FOLLOWING SURGERY OF THE MUSCULOSKELETAL SYSTEM: Primary | ICD-10-CM

## 2022-12-01 PROCEDURE — 97112 NEUROMUSCULAR REEDUCATION: CPT | Mod: GP

## 2022-12-01 PROCEDURE — 97016 VASOPNEUMATIC DEVICE THERAPY: CPT | Mod: GP

## 2022-12-01 PROCEDURE — 97110 THERAPEUTIC EXERCISES: CPT | Mod: GP

## 2022-12-14 ENCOUNTER — THERAPY VISIT (OUTPATIENT)
Dept: PHYSICAL THERAPY | Facility: CLINIC | Age: 32
End: 2022-12-14
Payer: COMMERCIAL

## 2022-12-14 DIAGNOSIS — Z47.89 AFTERCARE FOLLOWING SURGERY OF THE MUSCULOSKELETAL SYSTEM: Primary | ICD-10-CM

## 2022-12-14 DIAGNOSIS — M25.562 CHRONIC PAIN OF LEFT KNEE: ICD-10-CM

## 2022-12-14 DIAGNOSIS — G89.29 CHRONIC PAIN OF LEFT KNEE: ICD-10-CM

## 2022-12-14 PROCEDURE — 97110 THERAPEUTIC EXERCISES: CPT | Mod: GP

## 2022-12-14 PROCEDURE — 97016 VASOPNEUMATIC DEVICE THERAPY: CPT | Mod: GP

## 2022-12-30 ENCOUNTER — THERAPY VISIT (OUTPATIENT)
Dept: PHYSICAL THERAPY | Facility: CLINIC | Age: 32
End: 2022-12-30
Payer: COMMERCIAL

## 2022-12-30 DIAGNOSIS — Z47.89 AFTERCARE FOLLOWING SURGERY OF THE MUSCULOSKELETAL SYSTEM: Primary | ICD-10-CM

## 2022-12-30 DIAGNOSIS — M25.562 CHRONIC PAIN OF LEFT KNEE: ICD-10-CM

## 2022-12-30 DIAGNOSIS — G89.29 CHRONIC PAIN OF LEFT KNEE: ICD-10-CM

## 2022-12-30 PROCEDURE — 97016 VASOPNEUMATIC DEVICE THERAPY: CPT | Mod: GP

## 2022-12-30 PROCEDURE — 97530 THERAPEUTIC ACTIVITIES: CPT | Mod: GP

## 2022-12-30 PROCEDURE — 97140 MANUAL THERAPY 1/> REGIONS: CPT | Mod: 59

## 2022-12-30 PROCEDURE — 97110 THERAPEUTIC EXERCISES: CPT | Mod: 59

## 2023-01-04 DIAGNOSIS — Z98.890 S/P LEFT KNEE SURGERY: Primary | ICD-10-CM

## 2023-01-09 ENCOUNTER — ANCILLARY PROCEDURE (OUTPATIENT)
Dept: GENERAL RADIOLOGY | Facility: CLINIC | Age: 33
End: 2023-01-09
Attending: ORTHOPAEDIC SURGERY
Payer: COMMERCIAL

## 2023-01-09 ENCOUNTER — OFFICE VISIT (OUTPATIENT)
Dept: ORTHOPEDICS | Facility: CLINIC | Age: 33
End: 2023-01-09
Payer: COMMERCIAL

## 2023-01-09 VITALS — WEIGHT: 233 LBS | BODY MASS INDEX: 32.62 KG/M2 | HEIGHT: 71 IN

## 2023-01-09 DIAGNOSIS — M25.562 CHRONIC PAIN OF LEFT KNEE: Primary | ICD-10-CM

## 2023-01-09 DIAGNOSIS — Z98.890 S/P LEFT KNEE SURGERY: ICD-10-CM

## 2023-01-09 DIAGNOSIS — G89.29 CHRONIC PAIN OF LEFT KNEE: Primary | ICD-10-CM

## 2023-01-09 PROCEDURE — 99024 POSTOP FOLLOW-UP VISIT: CPT | Performed by: ORTHOPAEDIC SURGERY

## 2023-01-09 PROCEDURE — 73560 X-RAY EXAM OF KNEE 1 OR 2: CPT | Mod: LT | Performed by: RADIOLOGY

## 2023-01-09 RX ORDER — GABAPENTIN 100 MG/1
100 CAPSULE ORAL 3 TIMES DAILY
Qty: 90 CAPSULE | Refills: 0 | Status: SHIPPED | OUTPATIENT
Start: 2023-01-09

## 2023-01-09 NOTE — PROGRESS NOTES
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 Osteochondral allograft transplantation medial femoral condyle left knee  2. Proximal tibial osteotomy    History:  3 months following the above surgery.  Pain controlled.  Off opioids.  Describes some medially based symptoms that seems to be most consistent with irritation of the saphenous nerve.  He describes a shooting and burning pain that goes down the medial aspect of his leg even down towards his foot.  Its been there since surgery.  May be secondary to the block?  Not exactly sure.  Not particular well explained by the surgery.  He had been doing very well though he has had a little bit of a step back as a his activity is increased.      Exam:     General: Awake, Alert, and oriented. Articulates and communicates with a normal affect     Left Lower Extremity:    Incisions well healed without evidence of infection, no erythema, drainage, or wound dehiscence     No post-operative effusion or ecchymosis    Range of motion is full     Knee is stable to examination     TA/Gsc/EHL/FHL with 5/5 strength    Distal pulses palpable, toes are warm and well perfused     Imaging:  AP lateral radiographs obtained today show osteotomy in good position with no change in position of the osteotomy or the implants and good healing across the osteotomy site    Assesment:  12 weeks status post proximal tibial osteotomy and osteochondral allograft transplantation of the medial femoral condyle    Plan:   Weightbearing as tolerated  Range of motion as tolerated  No specific restrictions besides commonsense slow transition back to desired activities  Try to avoid running or jumping until 4 to 6 months  Follow-up in 3 months time with repeat AP lateral radiographs as well as standing alignment films  Will start Neurontin 100 mg p.o. 3 times daily and titrate up to 300 p.o. 3 times daily as needed I explained the  utilization of this medication to him and that it can make him sleepy.

## 2023-01-09 NOTE — LETTER
Date:January 9, 2023      Patient was self referred, no letter generated. Do not send.        [facility] Health Information

## 2023-01-09 NOTE — LETTER
1/9/2023         RE: Misha Buckner  [address] [facility] [address]        Dear Colleague,    Thank you for referring your patient, Misha Buckner, to the [facility] [place]. Please see a copy of my visit note below.    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 Osteochondral allograft transplantation medial femoral condyle left knee  2. Proximal tibial osteotomy    History:  3 months following the above surgery.  Pain controlled.  Off opioids.  Describes some medially based symptoms that seems to be most consistent with irritation of the saphenous nerve.  He describes a shooting and burning pain that goes down the medial aspect of his leg even down towards his foot.  Its been there since surgery.  May be secondary to the block?  Not exactly sure.  Not particular well explained by the surgery.  He had been doing very well though he has had a little bit of a step back as a his activity is increased.      Exam:     General: Awake, Alert, and oriented. Articulates and communicates with a normal affect     Left Lower Extremity:    Incisions well healed without evidence of infection, no erythema, drainage, or wound dehiscence     No post-operative effusion or ecchymosis    Range of motion is full     Knee is stable to examination     TA/Gsc/EHL/FHL with 5/5 strength    Distal pulses palpable, toes are warm and well perfused     Imaging:  AP lateral radiographs obtained today show osteotomy in good position with no change in position of the osteotomy or the implants and good healing across the osteotomy site    Assesment:  12 weeks status post proximal tibial osteotomy and osteochondral allograft transplantation of the medial femoral condyle    Plan:   Weightbearing as tolerated  Range of motion as tolerated  No specific restrictions besides commonsense slow transition back to desired  activities  Try to avoid running or jumping until 4 to 6 months  Follow-up in 3 months time with repeat AP lateral radiographs as well as standing alignment films  Will start Neurontin 100 mg p.o. 3 times daily and titrate up to 300 p.o. 3 times daily as needed I explained the utilization of this medication to him and that it can make him sleepy.          Again, thank you for allowing me to participate in the care of your patient.        Sincerely,        Ralf Robertson MD

## 2023-01-09 NOTE — NURSING NOTE
"Reason For Visit:   Chief Complaint   Patient presents with     Left Knee - RECHECK     DOS 10/11/2022 left knee osteochondral allograft transplantation to medial femoral condyle and proximal tibial osteotomy.      Date of surgery: 10/11/2022  Type of surgery:   PROCEDURE:  1. Osteochondral allograft transplantation medial femoral condyle left knee  2. Proximal tibial osteotomy    Patient reports that he will have an increase of pain and swelling after physical therapy, HEP and when he is on his feet for an extended period of time. Patient reports he will have intermittent episode of popping that will cause pain. He describes he will experience a burning sensation that will begin at his medial knee and radiate into his left foot.      Treatments for pain include Tylenol, elevate and ice.     SANE Score  Left Knee: 50%  Right Knee: 90-95%       Ht 1.803 m (5' 10.98\")   Wt 105.7 kg (233 lb)   BMI 32.52 kg/m         No Known Allergies    Current Outpatient Medications   Medication     acetaminophen (TYLENOL) 325 MG tablet     amLODIPine (NORVASC) 10 MG tablet     aspirin 81 MG EC tablet     hydrOXYzine (ATARAX) 25 MG tablet     oxyCODONE (ROXICODONE) 5 MG tablet     polyethylene glycol (MIRALAX) 17 g packet     senna-docusate (SENOKOT-S/PERICOLACE) 8.6-50 MG tablet     No current facility-administered medications for this visit.         Shani Adamson, ATC    "

## 2023-01-12 ENCOUNTER — THERAPY VISIT (OUTPATIENT)
Dept: PHYSICAL THERAPY | Facility: CLINIC | Age: 33
End: 2023-01-12
Payer: COMMERCIAL

## 2023-01-12 DIAGNOSIS — Z47.89 AFTERCARE FOLLOWING SURGERY OF THE MUSCULOSKELETAL SYSTEM: Primary | ICD-10-CM

## 2023-01-12 DIAGNOSIS — M25.562 CHRONIC PAIN OF LEFT KNEE: ICD-10-CM

## 2023-01-12 DIAGNOSIS — G89.29 CHRONIC PAIN OF LEFT KNEE: ICD-10-CM

## 2023-01-12 PROCEDURE — 97110 THERAPEUTIC EXERCISES: CPT | Mod: GP

## 2023-01-12 PROCEDURE — 97016 VASOPNEUMATIC DEVICE THERAPY: CPT | Mod: GP

## 2023-01-12 PROCEDURE — 97530 THERAPEUTIC ACTIVITIES: CPT | Mod: GP

## 2023-01-19 ENCOUNTER — THERAPY VISIT (OUTPATIENT)
Dept: PHYSICAL THERAPY | Facility: CLINIC | Age: 33
End: 2023-01-19
Payer: COMMERCIAL

## 2023-01-19 DIAGNOSIS — Z47.89 AFTERCARE FOLLOWING SURGERY OF THE MUSCULOSKELETAL SYSTEM: Primary | ICD-10-CM

## 2023-01-19 DIAGNOSIS — G89.29 CHRONIC PAIN OF LEFT KNEE: ICD-10-CM

## 2023-01-19 DIAGNOSIS — M25.562 CHRONIC PAIN OF LEFT KNEE: ICD-10-CM

## 2023-01-19 PROCEDURE — 97112 NEUROMUSCULAR REEDUCATION: CPT | Mod: GP

## 2023-01-19 PROCEDURE — 97110 THERAPEUTIC EXERCISES: CPT | Mod: GP

## 2023-01-19 NOTE — PROGRESS NOTES
[facility]    OUTPATIENT Physical Therapy ORTHOPEDIC EVALUATION  PLAN OF TREATMENT FOR OUTPATIENT REHABILITATION  (COMPLETE FOR INITIAL CLAIMS ONLY)  Patient's Last Name, First Name, M.I.  YOB: 1990  Misha Buckner    Provider s Name:  [facility]   Medical Record No.  [id number]   Start of Care Date:  10/14/22   Onset Date:  11/10/22    Treatment Diagnosis:  L Knee OCA, prox tib osteotomy Medical Diagnosis:     Aftercare following surgery of the musculoskeletal system  Chronic pain of left knee       Goals:     01/19/23 0500   Body Part   Goals listed below are for L knee   Goal #1   Goal #1 ambulation   Previous Functional Level No restrictions   Current Functional Level Minutes patient can walk   Performance Level <5 min WBAT with crutches   STG Target Performance Minutes patient will be able to walk   Performance Level 5 minutes   Rationale for safe household ambulation;for safe outdoor household ambulation;for safe community ambulation;to maintain proper body mechanics/posture while ambulating to avoid additional compensatory injury due to improper gait mechanics;to promote a healthy and active lifestyle   Due Date 11/25/22   Date Goal Met 12/14/22    LTG Target Performance Minutes patient will be able to  walk   Performance Level 20-30 minutes   Rationale for safe outdoor household ambulation;for safe household ambulation;for safe community ambulation;to maintain proper body mechanics/posture while ambulating to avoid additional compensatory injury due to improper gait mechanics;to promote a healthy and active lifestyle   Due Date 02/02/23   If goal not met, Why? Slow progress due to exacerbation of pain   Goal #2   Goal #2 stairs   Current Functional Level Ascend/descend stairs;one step at a time   STG Target Performance Ascend stairs;in a normal reciprocal  pattern   Rationale to enter/leave the house safely;to reach lower level of home safely;to reach upper level of home safely;for safe community access to buildings;for safe community ambulaton   Due Date 01/26/23   If goal not met, Why? Goal date progressed due to flare up of symptoms   LTG Target Performance Ascend/descend stairs;in a normal reciprocal pattern   Rationale to enter/leave the house safely;to reach upper level of home safely;to reach lower level of home safely;for safe community access to buildings;for safe community ambulaton   Due Date 01/14/23         Therapy Frequency:  2x/month for 3 months  Predicted Duration of Therapy Intervention:       Jeyson Madera, PT                 I CERTIFY THE NEED FOR THESE SERVICES FURNISHED UNDER        THIS PLAN OF TREATMENT AND WHILE UNDER MY CARE     (Physician attestation of this document indicates review and certification of the therapy plan).                     Certification Date From:  01/15/23   Certification Date To:  04/19/23    Referring Provider:  Ralf Goodrich*    Initial Assessment        See Epic Evaluation SOC Date: 10/14/22

## 2023-01-19 NOTE — PROGRESS NOTES
PROGRESS  REPORT    Progress reporting period is from 10/14/22 to 1/19/23.       SUBJECTIVE  Subjective changes noted by patient:  Knee is continuing to feel good. Soreness in muscle not in knee joint. Going up stairs is improving, going down stairs is still hard      Changes in function:  Yes (See Goal flowsheet attached for changes in current functional level)  Adverse reaction to treatment or activity: None    OBJECTIVE  Changes noted in objective findings:  Yes  Objective: Knee ROM 4-0-116 degrees, DL squat with slight weight shift to right side. Continued quad weakness with SL activities     ASSESSMENT/PLAN  Updated problem list and treatment plan: Diagnosis 1:  S/p L Knee OCA, prox tib osteotomy  Pain -  hot/cold therapy, electric stimulation, manual therapy, splint/taping/bracing/orthotics, self management, education and home program  Decreased ROM/flexibility - manual therapy, therapeutic exercise and home program  Decreased joint mobility - manual therapy, therapeutic exercise and home program  Decreased strength - therapeutic exercise, therapeutic activities and home program  Decreased proprioception - neuro re-education, therapeutic activities and home program  Edema - vasopneumatics and self management/home program  Decreased function - therapeutic activities and functional performance testing  STG/LTGs have been met or progress has been made towards goals:  Yes (See Goal flow sheet completed today.)  Assessment of Progress: Patient is meeting short term goals and is progressing towards long term goals.  Self Management Plans:  Patient has been instructed in a home treatment program.  Patient  has been instructed in self management of symptoms.  I have re-evaluated this patient and find that the nature, scope, duration and intensity of the therapy is appropriate for the medical condition of the patient.  Misha continues to require the following intervention to meet STG and LTG's:   PT    Recommendations:  This patient would benefit from continued therapy.     Frequency:  2 X a month, once daily  Duration:  for 3 months        Please refer to the daily flowsheet for treatment today, total treatment time and time spent performing 1:1 timed codes.

## 2023-02-16 ENCOUNTER — THERAPY VISIT (OUTPATIENT)
Dept: PHYSICAL THERAPY | Facility: CLINIC | Age: 33
End: 2023-02-16
Payer: COMMERCIAL

## 2023-02-16 DIAGNOSIS — Z47.89 AFTERCARE FOLLOWING SURGERY OF THE MUSCULOSKELETAL SYSTEM: Primary | ICD-10-CM

## 2023-02-16 DIAGNOSIS — G89.29 CHRONIC PAIN OF LEFT KNEE: ICD-10-CM

## 2023-02-16 DIAGNOSIS — M25.562 CHRONIC PAIN OF LEFT KNEE: ICD-10-CM

## 2023-02-16 PROCEDURE — 97110 THERAPEUTIC EXERCISES: CPT | Mod: GP

## 2023-02-16 PROCEDURE — 97112 NEUROMUSCULAR REEDUCATION: CPT | Mod: GP

## 2023-03-16 ENCOUNTER — THERAPY VISIT (OUTPATIENT)
Dept: PHYSICAL THERAPY | Facility: CLINIC | Age: 33
End: 2023-03-16
Payer: COMMERCIAL

## 2023-03-16 DIAGNOSIS — G89.29 CHRONIC PAIN OF LEFT KNEE: ICD-10-CM

## 2023-03-16 DIAGNOSIS — Z47.89 AFTERCARE FOLLOWING SURGERY OF THE MUSCULOSKELETAL SYSTEM: Primary | ICD-10-CM

## 2023-03-16 DIAGNOSIS — M25.562 CHRONIC PAIN OF LEFT KNEE: ICD-10-CM

## 2023-03-16 PROCEDURE — 97140 MANUAL THERAPY 1/> REGIONS: CPT | Mod: GP

## 2023-03-16 PROCEDURE — 97110 THERAPEUTIC EXERCISES: CPT | Mod: GP

## 2023-04-03 DIAGNOSIS — Z98.890 S/P LEFT KNEE SURGERY: Primary | ICD-10-CM

## 2023-04-06 ENCOUNTER — THERAPY VISIT (OUTPATIENT)
Dept: PHYSICAL THERAPY | Facility: CLINIC | Age: 33
End: 2023-04-06
Payer: COMMERCIAL

## 2023-04-06 DIAGNOSIS — G89.29 CHRONIC PAIN OF LEFT KNEE: ICD-10-CM

## 2023-04-06 DIAGNOSIS — M25.562 CHRONIC PAIN OF LEFT KNEE: ICD-10-CM

## 2023-04-06 DIAGNOSIS — Z47.89 AFTERCARE FOLLOWING SURGERY OF THE MUSCULOSKELETAL SYSTEM: Primary | ICD-10-CM

## 2023-04-06 PROCEDURE — 97110 THERAPEUTIC EXERCISES: CPT | Mod: GP

## 2023-04-06 NOTE — Clinical Note
Hi Dr. Robertson,  I just saw iMsha in PT. He continues to have flares ups of knee soreness which has slowed his progress with strengthening. I worked with him on finding a consistent program and strongly encouraged him to join a gym.   Let me know if you have any questions.  Jeyson

## 2023-04-06 NOTE — PROGRESS NOTES
PROGRESS  REPORT    Progress reporting period is from 1/19/23 to 4/6/23.       SUBJECTIVE  Subjective changes noted by patient:  Reports knee is sore and more painful in the last week. Feels like he has been more on his feet which may be a contributing factor. Is considering getting a gym membership.      Changes in function:  Yes (See Goal flowsheet attached for changes in current functional level)  Adverse reaction to treatment or activity: None    OBJECTIVE  Changes noted in objective findings:  Yes  Objective:   L knee ROM: 5-0-115 degrees.   Good DL squat. SL squat R 60 deg, L 40 deg limited due to pain and weakness.   Prone plank: 18 sec.     ASSESSMENT/PLAN  Updated problem list and treatment plan: Diagnosis 1:  S/p Left knee surgery  Pain -  hot/cold therapy, manual therapy, splint/taping/bracing/orthotics, self management, education and home program  Decreased strength - therapeutic exercise, therapeutic activities and home program  Decreased proprioception - neuro re-education, therapeutic activities and home program  Decreased function - therapeutic activities, home program and functional performance testing  STG/LTGs have been met or progress has been made towards goals:  Yes (See Goal flow sheet completed today.)  Assessment of Progress: The patient's condition has exacerbated.  Patient is meeting short term goals and is progressing towards long term goals.  Self Management Plans:  Patient has been instructed in a home treatment program.  Patient  has been instructed in self management of symptoms.  I have re-evaluated this patient and find that the nature, scope, duration and intensity of the therapy is appropriate for the medical condition of the patient.  Misha continues to require the following intervention to meet STG and LTG's:  PT    Recommendations:  This patient would benefit from continued therapy.     Frequency:  2 X a month, once daily  Duration:  for 3 months        Please refer to the  daily flowsheet for treatment today, total treatment time and time spent performing 1:1 timed codes.

## 2023-04-27 ENCOUNTER — MYC MEDICAL ADVICE (OUTPATIENT)
Dept: ORTHOPEDICS | Facility: CLINIC | Age: 33
End: 2023-04-27
Payer: COMMERCIAL

## 2023-04-27 ENCOUNTER — DOCUMENTATION ONLY (OUTPATIENT)
Dept: ORTHOPEDICS | Facility: CLINIC | Age: 33
End: 2023-04-27
Payer: COMMERCIAL

## 2023-04-27 NOTE — PROGRESS NOTES
Received Completed forms Yes   Faxed Forms Faxed To: [facility]   Fax Number: [phone]   Sent to [facility] (Date) 4/24/23

## 2023-05-04 ENCOUNTER — THERAPY VISIT (OUTPATIENT)
Dept: PHYSICAL THERAPY | Facility: CLINIC | Age: 33
End: 2023-05-04
Payer: COMMERCIAL

## 2023-05-04 DIAGNOSIS — M25.562 CHRONIC PAIN OF LEFT KNEE: ICD-10-CM

## 2023-05-04 DIAGNOSIS — G89.29 CHRONIC PAIN OF LEFT KNEE: ICD-10-CM

## 2023-05-04 DIAGNOSIS — Z47.89 AFTERCARE FOLLOWING SURGERY OF THE MUSCULOSKELETAL SYSTEM: Primary | ICD-10-CM

## 2023-05-04 PROCEDURE — 97110 THERAPEUTIC EXERCISES: CPT | Mod: GP

## 2023-05-04 PROCEDURE — 97530 THERAPEUTIC ACTIVITIES: CPT | Mod: GP

## 2023-05-04 ASSESSMENT — ACTIVITIES OF DAILY LIVING (ADL)
HOW_WOULD_YOU_RATE_THE_OVERALL_FUNCTION_OF_YOUR_KNEE_DURING_YOUR_USUAL_DAILY_ACTIVITIES?: ABNORMAL
AS_A_RESULT_OF_YOUR_KNEE_INJURY,_HOW_WOULD_YOU_RATE_YOUR_CURRENT_LEVEL_OF_DAILY_ACTIVITY?: NEARLY NORMAL
STAND: ACTIVITY IS MINIMALLY DIFFICULT
RISE FROM A CHAIR: ACTIVITY IS MINIMALLY DIFFICULT
SWELLING: I HAVE THE SYMPTOM BUT IT DOES NOT AFFECT MY ACTIVITY
PAIN: THE SYMPTOM AFFECTS MY ACTIVITY MODERATELY
LIMPING: THE SYMPTOM AFFECTS MY ACTIVITY SLIGHTLY
KNEE_ACTIVITY_OF_DAILY_LIVING_SCORE: 70
HOW_WOULD_YOU_RATE_THE_CURRENT_FUNCTION_OF_YOUR_KNEE_DURING_YOUR_USUAL_DAILY_ACTIVITIES_ON_A_SCALE_FROM_0_TO_100_WITH_100_BEING_YOUR_LEVEL_OF_KNEE_FUNCTION_PRIOR_TO_YOUR_INJURY_AND_0_BEING_THE_INABILITY_TO_PERFORM_ANY_OF_YOUR_USUAL_DAILY_ACTIVITIES?: 70
GO DOWN STAIRS: ACTIVITY IS MINIMALLY DIFFICULT
SIT WITH YOUR KNEE BENT: ACTIVITY IS SOMEWHAT DIFFICULT
SQUAT: ACTIVITY IS MINIMALLY DIFFICULT
GO UP STAIRS: ACTIVITY IS NOT DIFFICULT
KNEEL ON THE FRONT OF YOUR KNEE: ACTIVITY IS SOMEWHAT DIFFICULT
WALK: ACTIVITY IS SOMEWHAT DIFFICULT
STIFFNESS: THE SYMPTOM AFFECTS MY ACTIVITY SLIGHTLY
WEAKNESS: THE SYMPTOM AFFECTS MY ACTIVITY SLIGHTLY
GIVING WAY, BUCKLING OR SHIFTING OF KNEE: I HAVE THE SYMPTOM BUT IT DOES NOT AFFECT MY ACTIVITY
KNEE_ACTIVITY_OF_DAILY_LIVING_SUM: 49
RAW_SCORE: 49

## 2023-05-04 NOTE — PROGRESS NOTES
Psychiatric    OUTPATIENT Physical Therapy ORTHOPEDIC EVALUATION  PLAN OF TREATMENT FOR OUTPATIENT REHABILITATION  (COMPLETE FOR INITIAL CLAIMS ONLY)  Patient's Last Name, First Name, M.I.  YOB: 1990  Misha Buckner    Provider s Name:  Psychiatric   Medical Record No.  [id number]   Start of Care Date:  10/14/22   Onset Date:  11/10/22    Treatment Diagnosis:  L Knee OCA, prox tib osteotomy Medical Diagnosis:     Aftercare following surgery of the musculoskeletal system  Chronic pain of left knee       Goals:     05/04/23 0500   Body Part   Goals listed below are for L knee   Goal #1   Goal #1 ambulation   Previous Functional Level No restrictions   Current Functional Level Minutes patient can walk   Performance Level <5 min WBAT with crutches   STG Target Performance Minutes patient will be able to walk   Performance Level 5 minutes   Rationale for safe household ambulation;for safe outdoor household ambulation;for safe community ambulation;to maintain proper body mechanics/posture while ambulating to avoid additional compensatory injury due to improper gait mechanics;to promote a healthy and active lifestyle   Due Date 11/25/22   Date Goal Met 12/14/22    LTG Target Performance Minutes patient will be able to  walk   Performance Level 60   Rationale for safe outdoor household ambulation;for safe household ambulation;for safe community ambulation;to maintain proper body mechanics/posture while ambulating to avoid additional compensatory injury due to improper gait mechanics;to promote a healthy and active lifestyle   Due Date 08/04/23   Goal #2   Goal #2 stairs   Current Functional Level Ascend/descend stairs;one step at a time   STG Target Performance Ascend stairs;in a normal reciprocal pattern   Rationale to enter/leave the house safely;to reach lower level of  home safely;to reach upper level of home safely;for safe community access to buildings;for safe community ambulaton   Due Date 01/26/23   Date Goal Met 02/16/23   LTG Target Performance Ascend/descend stairs;in a normal reciprocal pattern   Rationale to enter/leave the house safely;to reach upper level of home safely;to reach lower level of home safely;for safe community access to buildings;for safe community ambulaton   Due Date 03/02/23   Date Goal Met 03/16/23         Therapy Frequency:  1x/month for 3 months  Predicted Duration of Therapy Intervention:       Jeyson Madera, PT                 I CERTIFY THE NEED FOR THESE SERVICES FURNISHED UNDER        THIS PLAN OF TREATMENT AND WHILE UNDER MY CARE     (Physician attestation of this document indicates review and certification of the therapy plan).                     Certification Date From:  04/20/23   Certification Date To:  08/04/23    Referring Provider:  Ralf Goodrich*    Initial Assessment        See Epic Evaluation SOC Date: 10/14/22

## 2023-05-04 NOTE — PROGRESS NOTES
PROGRESS  REPORT    Progress reporting period is from 4/6/23 to 5/4/23.       SUBJECTIVE  Subjective changes noted by patient:  Reports overall improvement, Walking >2 miles can still be painful and challenging. Stairs have significantly improved (Especially going up).     Changes in function:  Yes (See Goal flowsheet attached for changes in current functional level)  Adverse reaction to treatment or activity: None    OBJECTIVE  Changes noted in objective findings:  Yes  Knee ROM: 5-0-115 deg  SL squat for depth R 60 deg, L 55 deg   Prone plank 35 seconds     ASSESSMENT/PLAN  Updated problem list and treatment plan: Diagnosis 1:  S/p L knee OCA, prox tib osteotomy  Pain -  hot/cold therapy, manual therapy, splint/taping/bracing/orthotics, self management, education and home program  Decreased ROM/flexibility - manual therapy, therapeutic exercise and home program  Decreased strength - therapeutic exercise, therapeutic activities and home program  STG/LTGs have been met or progress has been made towards goals:  Yes (See Goal flow sheet completed today.)  Assessment of Progress: Patient is meeting short term goals and is progressing towards long term goals.  Self Management Plans:  Patient has been instructed in a home treatment program.  Patient  has been instructed in self management of symptoms.  I have re-evaluated this patient and find that the nature, scope, duration and intensity of the therapy is appropriate for the medical condition of the patient.  Misha continues to require the following intervention to meet STG and LTG's:  PT    Recommendations:  This patient would benefit from continued therapy.     Frequency:  1 X a month, once daily  Duration:  for 3 months        Please refer to the daily flowsheet for treatment today, total treatment time and time spent performing 1:1 timed codes.

## 2023-05-05 DIAGNOSIS — Z98.890 S/P LEFT KNEE SURGERY: Primary | ICD-10-CM

## 2023-05-08 ENCOUNTER — OFFICE VISIT (OUTPATIENT)
Dept: ORTHOPEDICS | Facility: CLINIC | Age: 33
End: 2023-05-08
Payer: COMMERCIAL

## 2023-05-08 ENCOUNTER — ANCILLARY PROCEDURE (OUTPATIENT)
Dept: GENERAL RADIOLOGY | Facility: CLINIC | Age: 33
End: 2023-05-08
Attending: ORTHOPAEDIC SURGERY
Payer: COMMERCIAL

## 2023-05-08 VITALS — WEIGHT: 233 LBS | BODY MASS INDEX: 32.62 KG/M2 | HEIGHT: 71 IN

## 2023-05-08 DIAGNOSIS — Z98.890 S/P LEFT KNEE SURGERY: ICD-10-CM

## 2023-05-08 DIAGNOSIS — G89.29 CHRONIC PAIN OF LEFT KNEE: Primary | ICD-10-CM

## 2023-05-08 DIAGNOSIS — M25.562 CHRONIC PAIN OF LEFT KNEE: Primary | ICD-10-CM

## 2023-05-08 PROCEDURE — 73560 X-RAY EXAM OF KNEE 1 OR 2: CPT | Mod: LT | Performed by: RADIOLOGY

## 2023-05-08 PROCEDURE — 77073 BONE LENGTH STUDIES: CPT | Performed by: STUDENT IN AN ORGANIZED HEALTH CARE EDUCATION/TRAINING PROGRAM

## 2023-05-08 PROCEDURE — 99212 OFFICE O/P EST SF 10 MIN: CPT | Mod: GC | Performed by: ORTHOPAEDIC SURGERY

## 2023-05-08 NOTE — PROGRESS NOTES
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 Osteochondral allograft transplantation medial femoral condyle left knee  2. Proximal tibial osteotomy    History:  6 months following the above surgery.  Pain controlled.  Progressing with PT.     Exam:     General: Awake, Alert, and oriented. Articulates and communicates with a normal affect     Left Lower Extremity:    Incisions well healed without evidence of infection, no erythema, drainage, or wound dehiscence     No post-operative effusion or ecchymosis    Range of motion is full     Knee is stable to examination     TA/Gsc/EHL/FHL with 5/5 strength    Distal pulses palpable, toes are warm and well perfused     Imaging:  AP lateral radiographs obtained today show osteotomy in good position with no change in position of the osteotomy or the implants and good healing across the osteotomy site. There is healing compared to previous imaging.     Assesment:  6 months status post proximal tibial osteotomy and osteochondral allograft transplantation of the medial femoral condyle    Plan:   Weightbearing as tolerated  Range of motion as tolerated  No specific restrictions besides commonsense slow transition back to desired activities  -follow up 1 year    Bruce Begum MD   Orthopedic Surgery Resident

## 2023-05-08 NOTE — LETTER
5/8/2023         RE: Misha Buckner  [address] [facility] [address]        Dear Colleague,    Thank you for referring your patient, Misha Buckner, to the [facility] [place]. Please see a copy of my visit note below.    Diagnosis:  Full-thickness chondral defect medial femoral condyle left knee  Varus malalignment left knee    Procedures:  Osteochondral allograft transplantation medial femoral condyle left knee  Proximal tibial osteotomy    History:  6 months following the above surgery.  Pain controlled.  Progressing with PT.     Exam:     General: Awake, Alert, and oriented. Articulates and communicates with a normal affect     Left Lower Extremity:  Incisions well healed without evidence of infection, no erythema, drainage, or wound dehiscence   No post-operative effusion or ecchymosis  Range of motion is full   Knee is stable to examination   TA/Gsc/EHL/FHL with 5/5 strength  Distal pulses palpable, toes are warm and well perfused     Imaging:  AP lateral radiographs obtained today show osteotomy in good position with no change in position of the osteotomy or the implants and good healing across the osteotomy site. There is healing compared to previous imaging.     Assesment:  6 months status post proximal tibial osteotomy and osteochondral allograft transplantation of the medial femoral condyle    Plan:   Weightbearing as tolerated  Range of motion as tolerated  No specific restrictions besides commonsense slow transition back to desired activities  -follow up 1 year    Bruce MD Shy   Orthopedic Surgery Resident      Patient seen and examined with the resident. I also personally reviewed the images and interpreted the imaging myself.     Assesment: 6-month status post osteochondral allograft transplantation and proximal tibial osteotomy.  Doing well.  X-rays show good healing and excellent alignment following the correction    Plan: Weightbearing as  tolerated, range of motion as tolerated.  Seems to have made much improvement.  Follow-up at the 1 year anniversary of surgery with repeat AP lateral radiographs as well as standing 6 foot alignment films    I agree with history, physical and imaging as well as the assessment and plan as detailed by Dr. Begum.         Sincerely,        Ralf Robertson MD

## 2023-05-08 NOTE — NURSING NOTE
"Reason For Visit:   Chief Complaint   Patient presents with     Left Knee - RECHECK     DOS 10/11/2022 left knee osteochondroal allograft transplantation to medial femoral condyle and proximal tibial osteotomy.      ?  No  Occupation None.    Date of injury: None  Type of injury: NA.    Date of surgery: 10/11/2022  Type of surgery:   PROCEDURE:  1. Osteochondral allograft transplantation medial femoral condyle left knee  2. Proximal tibial osteotomy    Patient reports that he continues to have discomfort over the anterior aspect of his left knee. He has continued with attending physical therapy and performing his HEP. He is currently not working at this time. He does state his left knee will become swollen when he is on his feet for a few days but will subside with rest.       SANE Score  Left Knee: 60  Right Knee: 90    Pain Assessment  Patient Currently in Pain: Yes  0-10 Pain Scale: 3  Primary Pain Location: Knee    Ht 1.803 m (5' 10.98\")   Wt 105.7 kg (233 lb)   BMI 32.52 kg/m         No Known Allergies    Current Outpatient Medications   Medication     acetaminophen (TYLENOL) 325 MG tablet     gabapentin (NEURONTIN) 100 MG capsule     amLODIPine (NORVASC) 10 MG tablet     aspirin 81 MG EC tablet     hydrOXYzine (ATARAX) 25 MG tablet     oxyCODONE (ROXICODONE) 5 MG tablet     polyethylene glycol (MIRALAX) 17 g packet     senna-docusate (SENOKOT-S/PERICOLACE) 8.6-50 MG tablet     No current facility-administered medications for this visit.         Shani Adamson, ATC    "

## 2023-05-08 NOTE — PROGRESS NOTES
Patient seen and examined with the resident. I also personally reviewed the images and interpreted the imaging myself.     Assesment: 6-month status post osteochondral allograft transplantation and proximal tibial osteotomy.  Doing well.  X-rays show good healing and excellent alignment following the correction    Plan: Weightbearing as tolerated, range of motion as tolerated.  Seems to have made much improvement.  Follow-up at the 1 year anniversary of surgery with repeat AP lateral radiographs as well as standing 6 foot alignment films    I agree with history, physical and imaging as well as the assessment and plan as detailed by Dr. Begum.

## 2023-06-30 ENCOUNTER — THERAPY VISIT (OUTPATIENT)
Dept: PHYSICAL THERAPY | Facility: CLINIC | Age: 33
End: 2023-06-30
Payer: COMMERCIAL

## 2023-06-30 DIAGNOSIS — M25.562 CHRONIC PAIN OF LEFT KNEE: ICD-10-CM

## 2023-06-30 DIAGNOSIS — G89.29 CHRONIC PAIN OF LEFT KNEE: ICD-10-CM

## 2023-06-30 DIAGNOSIS — Z47.89 AFTERCARE FOLLOWING SURGERY OF THE MUSCULOSKELETAL SYSTEM: Primary | ICD-10-CM

## 2023-06-30 PROCEDURE — 97110 THERAPEUTIC EXERCISES: CPT | Mod: GP

## 2023-06-30 PROCEDURE — 97530 THERAPEUTIC ACTIVITIES: CPT | Mod: GP

## 2023-06-30 ASSESSMENT — ACTIVITIES OF DAILY LIVING (ADL)
WEAKNESS: I HAVE THE SYMPTOM BUT IT DOES NOT AFFECT MY ACTIVITY
KNEEL ON THE FRONT OF YOUR KNEE: ACTIVITY IS MINIMALLY DIFFICULT
SQUAT: ACTIVITY IS NOT DIFFICULT
STAND: ACTIVITY IS NOT DIFFICULT
SWELLING: I DO NOT HAVE THE SYMPTOM
KNEE_ACTIVITY_OF_DAILY_LIVING_SCORE: 94.29
AS_A_RESULT_OF_YOUR_KNEE_INJURY,_HOW_WOULD_YOU_RATE_YOUR_CURRENT_LEVEL_OF_DAILY_ACTIVITY?: NEARLY NORMAL
RISE FROM A CHAIR: ACTIVITY IS NOT DIFFICULT
KNEE_ACTIVITY_OF_DAILY_LIVING_SUM: 66
GIVING WAY, BUCKLING OR SHIFTING OF KNEE: I DO NOT HAVE THE SYMPTOM
HOW_WOULD_YOU_RATE_THE_CURRENT_FUNCTION_OF_YOUR_KNEE_DURING_YOUR_USUAL_DAILY_ACTIVITIES_ON_A_SCALE_FROM_0_TO_100_WITH_100_BEING_YOUR_LEVEL_OF_KNEE_FUNCTION_PRIOR_TO_YOUR_INJURY_AND_0_BEING_THE_INABILITY_TO_PERFORM_ANY_OF_YOUR_USUAL_DAILY_ACTIVITIES?: 75
LIMPING: I DO NOT HAVE THE SYMPTOM
HOW_WOULD_YOU_RATE_THE_OVERALL_FUNCTION_OF_YOUR_KNEE_DURING_YOUR_USUAL_DAILY_ACTIVITIES?: NEARLY NORMAL
SIT WITH YOUR KNEE BENT: ACTIVITY IS NOT DIFFICULT
GO DOWN STAIRS: ACTIVITY IS NOT DIFFICULT
RAW_SCORE: 66
WALK: ACTIVITY IS NOT DIFFICULT
STIFFNESS: I HAVE THE SYMPTOM BUT IT DOES NOT AFFECT MY ACTIVITY
PAIN: I HAVE THE SYMPTOM BUT IT DOES NOT AFFECT MY ACTIVITY
GO UP STAIRS: ACTIVITY IS NOT DIFFICULT

## 2023-07-09 ENCOUNTER — HEALTH MAINTENANCE LETTER (OUTPATIENT)
Age: 33
End: 2023-07-09

## 2023-12-12 ENCOUNTER — DOCUMENTATION ONLY (OUTPATIENT)
Dept: ORTHOPEDICS | Facility: CLINIC | Age: 33
End: 2023-12-12
Payer: COMMERCIAL

## 2023-12-12 NOTE — PROGRESS NOTES
Received Completed forms Yes   Faxed Forms Emailed to patient at:   [email or web address]   Sent to HIM (Date) 12/11/23

## 2024-01-10 ENCOUNTER — ANCILLARY PROCEDURE (OUTPATIENT)
Dept: GENERAL RADIOLOGY | Facility: CLINIC | Age: 34
End: 2024-01-10
Payer: COMMERCIAL

## 2024-01-10 ENCOUNTER — TELEPHONE (OUTPATIENT)
Dept: ORTHOPEDICS | Facility: CLINIC | Age: 34
End: 2024-01-10

## 2024-01-10 ENCOUNTER — OFFICE VISIT (OUTPATIENT)
Dept: ORTHOPEDICS | Facility: CLINIC | Age: 34
End: 2024-01-10
Payer: COMMERCIAL

## 2024-01-10 VITALS — WEIGHT: 231 LBS | BODY MASS INDEX: 32.34 KG/M2 | HEIGHT: 71 IN

## 2024-01-10 DIAGNOSIS — Z98.890 S/P LEFT KNEE SURGERY: ICD-10-CM

## 2024-01-10 DIAGNOSIS — Z98.890 S/P LEFT KNEE SURGERY: Primary | ICD-10-CM

## 2024-01-10 PROCEDURE — 73560 X-RAY EXAM OF KNEE 1 OR 2: CPT | Mod: LT | Performed by: RADIOLOGY

## 2024-01-10 PROCEDURE — 99213 OFFICE O/P EST LOW 20 MIN: CPT

## 2024-01-10 PROCEDURE — 77073 BONE LENGTH STUDIES: CPT | Performed by: STUDENT IN AN ORGANIZED HEALTH CARE EDUCATION/TRAINING PROGRAM

## 2024-01-10 NOTE — TELEPHONE ENCOUNTER
Patient Contacted for the patient to call back and schedule the following:    Appointment type: Return knee and MRI  Provider: Marcelo  Return date: 2/12   Transferred to [facility]

## 2024-01-10 NOTE — LETTER
1/10/2024         RE: Misha Buckner  [address] [facility] [address]        Dear Colleague,    Thank you for referring your patient, Misha Buckner, to the [facility] [place]. Please see a copy of my visit note below.    Chief Complaint:   New right knee left knee pain 15 months post op     Procedures:  Osteochondral allograft transplantation medial femoral condyle left knee, proximal tibial osteotomy - Dr. Robertson, 10/11/2022, [facility]    History:  Misha Buckner is a 33 year old patient s/p above procedure with Dr. Robertson, here with new left knee pain. 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 He was able to work for one week and had opportunities to sit during the work day. After one week, he noticed new medial and lateral sided left knee pain and mechanical symptoms. Does not recall a specific event. Worse with standing, ambulation. Better with ice and laying down. Has been unable to work since. This was his first attempt at regular work since his surgery in 2022. Of note, he has continue to smoke cigarettes though notes he has cut back to about 1 pack per 2 weeks.       Exam:     General: Awake, Alert, and oriented. Articulates and communicates with a normal affect     Left Lower Extremity:  Incisions well healed without evidence of infection, no erythema, drainage, or wound dehiscence   Slight effusion   Range of motion 0-130, stable to varus and valgus stress   1A Lachman, no pivot   TA/Gsc/EHL/FHL with 5/5 strength  Distal pulses palpable, toes are warm and well perfused   Gait: Antalgic gait, less time on the left     Imaging:  Radiographs of the left knee from 1/10/23 were independently reviewed by me, compared to prior imaging, and findings were discussed with the patient today. The imaging demonstrates well healed tibial osteotomy site, no acute  abnormality.    Medications:     Current Outpatient Medications:     acetaminophen (TYLENOL) 325 MG tablet, Take 2 tablets (650 mg) by mouth every 4 hours as needed for other (mild pain), Disp: 100 tablet, Rfl: 0    amLODIPine (NORVASC) 10 MG tablet, Take 1 tablet (10 mg) by mouth daily (Patient not taking: Reported on 5/8/2023), Disp: , Rfl:     aspirin 81 MG EC tablet, Take 1 tablet (81 mg) by mouth 2 times daily (Patient not taking: Reported on 1/9/2023), Disp: 60 tablet, Rfl: 0    gabapentin (NEURONTIN) 100 MG capsule, Take 1 capsule (100 mg) by mouth 3 times daily, Disp: 90 capsule, Rfl: 0    hydrOXYzine (ATARAX) 25 MG tablet, Take 1 tablet (25 mg) by mouth every 6 hours as needed for itching or anxiety (with pain, moderate pain) (Patient not taking: Reported on 1/9/2023), Disp: 30 tablet, Rfl: 0    oxyCODONE (ROXICODONE) 5 MG tablet, Take 1-2 tablets (5-10 mg) by mouth every 4 hours as needed for moderate to severe pain (Patient not taking: Reported on 1/9/2023), Disp: 16 tablet, Rfl: 0    polyethylene glycol (MIRALAX) 17 g packet, Take 17 g by mouth daily (Patient not taking: Reported on 10/26/2022), Disp: 10 packet, Rfl: 0    senna-docusate (SENOKOT-S/PERICOLACE) 8.6-50 MG tablet, Take 1-2 tablets by mouth 2 times daily Take while on oral narcotics to prevent or treat constipation. (Patient not taking: Reported on 10/26/2022), Disp: 30 tablet, Rfl: 0    Assessment:  New left knee pain with associated mechanical symptoms 15 months s/p osteochondral allograft transplantation left medial femoral condyle, proximal tibial osteotomy with Dr. Robertson. Discussed radiographs from today are reassuring, however, given acute onset of new symptoms it is reasonable to proceed with an MRI of the left knee. Also discussed the importance of smoking cessation, particularly after cartilage/joint preservation surgery.     Plan:   -MRI L knee ordered today  -Follow up with Dr. Robertson to review results and discuss next  steps  -No specific restrictions at this time     Wen Edwards PA-C 1/10/2024 4:14 PM  Orthopedic Surgery

## 2024-01-10 NOTE — NURSING NOTE
"Reason For Visit:   Chief Complaint   Patient presents with    RECHECK     Left lateral and distal knee pain for 2.5 weeks. Started a new job as a . Pain began 1 week into working. Sharp pain. Full ROM but worse with flexion. Cannot sleep due to pain.        Ht 1.803 m (5' 11\")   Wt 104.8 kg (231 lb)   BMI 32.22 kg/m           Khalida Francis ATC    "

## 2024-01-10 NOTE — PROGRESS NOTES
Chief Complaint:   New right knee left knee pain 15 months post op     Procedures:  Osteochondral allograft transplantation medial femoral condyle left knee, proximal tibial osteotomy - Dr. Robertson, 10/11/2022, [facility]    History:  Misha Buckner is a 33 year old patient s/p above procedure with Dr. Robertson, here with new left knee pain. 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 He was able to work for one week and had opportunities to sit during the work day. After one week, he noticed new medial and lateral sided left knee pain and mechanical symptoms. Does not recall a specific event. Worse with standing, ambulation. Better with ice and laying down. Has been unable to work since. This was his first attempt at regular work since his surgery in 2022. Of note, he has continue to smoke cigarettes though notes he has cut back to about 1 pack per 2 weeks.       Exam:     General: Awake, Alert, and oriented. Articulates and communicates with a normal affect     Left Lower Extremity:  Incisions well healed without evidence of infection, no erythema, drainage, or wound dehiscence   Slight effusion   Range of motion 0-130, stable to varus and valgus stress   1A Lachman, no pivot   TA/Gsc/EHL/FHL with 5/5 strength  Distal pulses palpable, toes are warm and well perfused   Gait: Antalgic gait, less time on the left     Imaging:  Radiographs of the left knee from 1/10/23 were independently reviewed by me, compared to prior imaging, and findings were discussed with the patient today. The imaging demonstrates well healed tibial osteotomy site, no acute abnormality.    Medications:     Current Outpatient Medications:     acetaminophen (TYLENOL) 325 MG tablet, Take 2 tablets (650 mg) by mouth every 4 hours as needed for other (mild pain), Disp: 100 tablet, Rfl: 0    amLODIPine (NORVASC) 10 MG tablet, Take 1 tablet (10 mg) by mouth daily  (Patient not taking: Reported on 5/8/2023), Disp: , Rfl:     aspirin 81 MG EC tablet, Take 1 tablet (81 mg) by mouth 2 times daily (Patient not taking: Reported on 1/9/2023), Disp: 60 tablet, Rfl: 0    gabapentin (NEURONTIN) 100 MG capsule, Take 1 capsule (100 mg) by mouth 3 times daily, Disp: 90 capsule, Rfl: 0    hydrOXYzine (ATARAX) 25 MG tablet, Take 1 tablet (25 mg) by mouth every 6 hours as needed for itching or anxiety (with pain, moderate pain) (Patient not taking: Reported on 1/9/2023), Disp: 30 tablet, Rfl: 0    oxyCODONE (ROXICODONE) 5 MG tablet, Take 1-2 tablets (5-10 mg) by mouth every 4 hours as needed for moderate to severe pain (Patient not taking: Reported on 1/9/2023), Disp: 16 tablet, Rfl: 0    polyethylene glycol (MIRALAX) 17 g packet, Take 17 g by mouth daily (Patient not taking: Reported on 10/26/2022), Disp: 10 packet, Rfl: 0    senna-docusate (SENOKOT-S/PERICOLACE) 8.6-50 MG tablet, Take 1-2 tablets by mouth 2 times daily Take while on oral narcotics to prevent or treat constipation. (Patient not taking: Reported on 10/26/2022), Disp: 30 tablet, Rfl: 0    Assessment:  New left knee pain with associated mechanical symptoms 15 months s/p osteochondral allograft transplantation left medial femoral condyle, proximal tibial osteotomy with Dr. Robertson. Discussed radiographs from today are reassuring, however, given acute onset of new symptoms it is reasonable to proceed with an MRI of the left knee. Also discussed the importance of smoking cessation, particularly after cartilage/joint preservation surgery.     Plan:   -MRI L knee ordered today  -Follow up with Dr. Robertson to review results and discuss next steps  -No specific restrictions at this time     Wen Edwards PA-C 1/10/2024 4:14 PM  Orthopedic Surgery

## 2024-01-23 ENCOUNTER — DOCUMENTATION ONLY (OUTPATIENT)
Dept: ORTHOPEDICS | Facility: CLINIC | Age: 34
End: 2024-01-23
Payer: COMMERCIAL

## 2024-01-23 NOTE — PROGRESS NOTES
Received Completed forms Yes   Faxed Forms Emailed to patient at   [email or web address]   Sent to HIM (Date) 1/22/24

## 2024-02-03 ENCOUNTER — ANCILLARY PROCEDURE (OUTPATIENT)
Dept: MRI IMAGING | Facility: CLINIC | Age: 34
End: 2024-02-03
Payer: COMMERCIAL

## 2024-02-03 DIAGNOSIS — Z98.890 S/P LEFT KNEE SURGERY: ICD-10-CM

## 2024-02-03 PROCEDURE — 73721 MRI JNT OF LWR EXTRE W/O DYE: CPT | Mod: LT | Performed by: RADIOLOGY

## 2024-02-12 ENCOUNTER — OFFICE VISIT (OUTPATIENT)
Dept: ORTHOPEDICS | Facility: CLINIC | Age: 34
End: 2024-02-12
Payer: COMMERCIAL

## 2024-02-12 VITALS — BODY MASS INDEX: 32.34 KG/M2 | HEIGHT: 71 IN | WEIGHT: 231 LBS

## 2024-02-12 DIAGNOSIS — Z98.890 S/P LEFT KNEE SURGERY: Primary | ICD-10-CM

## 2024-02-12 PROCEDURE — 99213 OFFICE O/P EST LOW 20 MIN: CPT | Performed by: ORTHOPAEDIC SURGERY

## 2024-02-12 NOTE — LETTER
2/12/2024         RE: Misha Buckner  [address] [facility] [address]        Dear Colleague,    Thank you for referring your patient, Misha Buckner, to the [facility] [place]. Please see a copy of my visit note below.    Chief Complaint:   New right knee left knee pain 15 months post op     Procedures:  Osteochondral allograft transplantation medial femoral condyle left knee, proximal tibial osteotomy - Dr. Robertson, 10/11/2022, [facility]    History:  Misha Buckner returns to my clinic today for interval follow-up he recently saw my PA who recognized that the patient was having some knee pain and ordered new imaging.  They follow-up in my clinic to review.  He admits that he has been working quite a bit and was really unable to tolerate the rigors of his job that required standing.    Exam:     General: Awake, Alert, and oriented. Articulates and communicates with a normal affect     Left Lower Extremity:  Incisions well healed without evidence of infection, no erythema, drainage, or wound dehiscence   Slight effusion   Range of motion 0-130, stable to varus and valgus stress   0 Lachman, no pivot   TA/Gsc/EHL/FHL with 5/5 strength  Distal pulses palpable, toes are warm and well perfused       Imaging:  Standing 6 foot alignment films obtained in January 2024 shows neutral alignment that has been well corrected by the osteotomy.  Further AP and lateral radiographs show complete healing of the osteotomy.    MRI shows good position of the osteochondral allograft some subchondral cystic changes noted however it is unclear clinical significance.  Excellent incorporation at the graft native bone interface.      Assessment:  Left knee pain 16 months status post proximal tibial osteotomy and osteochondral allograft transplantation    Plan:   Weightbearing as tolerated  Range of motion as tolerated  Will provide work restrictions to try to limit standing and walking to less  than 1 hour at a time and if does 1 hour then provided least 1 hour of sitting.  This will start in approximately 2 months where he commits himself to a therapy program to build strength  Follow-up as needed    If his symptoms should continue he does not get better I think we could consider arthroscopic evaluation the overall pretty confident with the imaging that he should overall doing well          Again, thank you for allowing me to participate in the care of your patient.        Sincerely,        Ralf Robertson MD

## 2024-02-12 NOTE — PROGRESS NOTES
Chief Complaint:   New right knee left knee pain 15 months post op     Procedures:  Osteochondral allograft transplantation medial femoral condyle left knee, proximal tibial osteotomy - Dr. Robertson, 10/11/2022, [facility]    History:  Misha Buckner returns to my clinic today for interval follow-up he recently saw my PA who recognized that the patient was having some knee pain and ordered new imaging.  They follow-up in my clinic to review.  He admits that he has been working quite a bit and was really unable to tolerate the rigors of his job that required standing.    Exam:     General: Awake, Alert, and oriented. Articulates and communicates with a normal affect     Left Lower Extremity:  Incisions well healed without evidence of infection, no erythema, drainage, or wound dehiscence   Slight effusion   Range of motion 0-130, stable to varus and valgus stress   0 Lachman, no pivot   TA/Gsc/EHL/FHL with 5/5 strength  Distal pulses palpable, toes are warm and well perfused       Imaging:  Standing 6 foot alignment films obtained in January 2024 shows neutral alignment that has been well corrected by the osteotomy.  Further AP and lateral radiographs show complete healing of the osteotomy.    MRI shows good position of the osteochondral allograft some subchondral cystic changes noted however it is unclear clinical significance.  Excellent incorporation at the graft native bone interface.      Assessment:  Left knee pain 16 months status post proximal tibial osteotomy and osteochondral allograft transplantation    Plan:   Weightbearing as tolerated  Range of motion as tolerated  Will provide work restrictions to try to limit standing and walking to less than 1 hour at a time and if does 1 hour then provided least 1 hour of sitting.  This will start in approximately 2 months where he commits himself to a therapy program to build strength  Follow-up as needed    If his symptoms should continue he does not get better I  think we could consider arthroscopic evaluation the overall pretty confident with the imaging that he should overall doing well

## 2024-09-01 ENCOUNTER — HEALTH MAINTENANCE LETTER (OUTPATIENT)
Age: 34
End: 2024-09-01

## 2025-03-10 DIAGNOSIS — Z98.890 S/P LEFT KNEE SURGERY: Primary | ICD-10-CM

## 2025-03-24 ENCOUNTER — ANCILLARY PROCEDURE (OUTPATIENT)
Dept: GENERAL RADIOLOGY | Facility: CLINIC | Age: 35
End: 2025-03-24
Attending: ORTHOPAEDIC SURGERY
Payer: COMMERCIAL

## 2025-03-24 ENCOUNTER — OFFICE VISIT (OUTPATIENT)
Dept: ORTHOPEDICS | Facility: CLINIC | Age: 35
End: 2025-03-24
Payer: COMMERCIAL

## 2025-03-24 VITALS — HEIGHT: 71 IN | BODY MASS INDEX: 32.76 KG/M2 | WEIGHT: 234 LBS

## 2025-03-24 DIAGNOSIS — Z98.890 S/P LEFT KNEE SURGERY: Primary | ICD-10-CM

## 2025-03-24 DIAGNOSIS — Z98.890 S/P LEFT KNEE SURGERY: ICD-10-CM

## 2025-03-24 PROCEDURE — 73562 X-RAY EXAM OF KNEE 3: CPT | Mod: LT | Performed by: RADIOLOGY

## 2025-03-24 PROCEDURE — 99213 OFFICE O/P EST LOW 20 MIN: CPT | Performed by: ORTHOPAEDIC SURGERY

## 2025-03-24 NOTE — PROGRESS NOTES
Chief Complaint:   Status post left knee osteochondral allograft transplantation and proximal tibial osteotomy.  Doing well.  Pending right sided knee surgery at an outside hospital    Procedures:  Osteochondral allograft transplantation medial femoral condyle left knee, proximal tibial osteotomy - 10/11/2022,    History:  Overall doing well 2-1/2 years following proximal tibial osteotomy and osteochondral allograft transplantation.  The patient reports significant improvement of his symptoms from his preoperative state.  He states that he can do the things he wants to do including workout.  He has right sided knee surgery coming up in a week or 2 and he wanted to have his left knee checked out before he underwent right knee surgery.  This right knee surgery is pending at an outside hospital.    Exam:     General: Awake, Alert, and oriented. Articulates and communicates with a normal affect     Left Lower Extremity:  Healed incisions  No effusion   Range of motion is full  stable to varus and valgus stress   0 Lachman, no pivot   TA/Gsc/EHL/FHL with 5/5 strength  Distal pulses palpable, toes are warm and well perfused       Imaging:  AP and lateral radiographs obtained today and reviewed by myself show excellent healing across the osteotomy site which looks complete.      Standing 6 foot alignment films obtained previously and reviewed by myself show  alignment is neutral.    Assessment:  Healed proximal tibial osteotomy left knee and osteochondral allograft transplantation.  Excellent position and overall functionality    Plan:   Weightbearing as tolerated  Range of motion as tolerated  No specific restrictions  Cleared for right sided knee surgery without restriction secondary to his left knee  Follow-up with me as needed

## 2025-03-24 NOTE — LETTER
3/24/2025      Misha Buckner  [address] [facility] [address]      Dear Colleague,    Thank you for referring your patient, Misha Buckner, to the [facility] [place]. Please see a copy of my visit note below.    Chief Complaint:   Status post left knee osteochondral allograft transplantation and proximal tibial osteotomy.  Doing well.  Pending right sided knee surgery at an outside hospital    Procedures:  Osteochondral allograft transplantation medial femoral condyle left knee, proximal tibial osteotomy - 10/11/2022,    History:  Overall doing well 2-1/2 years following proximal tibial osteotomy and osteochondral allograft transplantation.  The patient reports significant improvement of his symptoms from his preoperative state.  He states that he can do the things he wants to do including workout.  He has right sided knee surgery coming up in a week or 2 and he wanted to have his left knee checked out before he underwent right knee surgery.  This right knee surgery is pending at an outside hospital.    Exam:     General: Awake, Alert, and oriented. Articulates and communicates with a normal affect     Left Lower Extremity:  Healed incisions  No effusion   Range of motion is full  stable to varus and valgus stress   0 Lachman, no pivot   TA/Gsc/EHL/FHL with 5/5 strength  Distal pulses palpable, toes are warm and well perfused       Imaging:  AP and lateral radiographs obtained today and reviewed by myself show excellent healing across the osteotomy site which looks complete.      Standing 6 foot alignment films obtained previously and reviewed by myself show  alignment is neutral.    Assessment:  Healed proximal tibial osteotomy left knee and osteochondral allograft transplantation.  Excellent position and overall functionality    Plan:   Weightbearing as tolerated  Range of motion as tolerated  No specific restrictions  Cleared for right sided knee surgery without  restriction secondary to his left knee  Follow-up with me as needed          Again, thank you for allowing me to participate in the care of your patient.        Sincerely,        Ralf Robertson MD    Electronically signed

## 2025-03-24 NOTE — NURSING NOTE
"Reason For Visit:   Chief Complaint   Patient presents with    RECHECK     DOS: 10/11/22 left knee open osteochondral allograft transplantation to medial femoral condyle, PTO     Date of surgery: 10/11/22  Type of surgery:   PROCEDURE:  Osteochondral allograft transplantation medial femoral condyle left knee  Proximal tibial osteotomy    He has upcoming surgery next month and is starting to have pain in his left knee over the last 2 months. Would like to make sure this knee is safe for his upcoming surgery.    SANE Score  Left Knee: 80  Right Knee: 30    Pain Assessment  Patient Currently in Pain: Yes  0-10 Pain Scale: 4  Primary Pain Location: Knee    Ht 1.803 m (5' 11\")   Wt 106.1 kg (234 lb)   BMI 32.64 kg/m         No Known Allergies    Current Outpatient Medications   Medication Sig Dispense Refill    acetaminophen (TYLENOL) 325 MG tablet Take 2 tablets (650 mg) by mouth every 4 hours as needed for other (mild pain) 100 tablet 0    gabapentin (NEURONTIN) 100 MG capsule Take 1 capsule (100 mg) by mouth 3 times daily 90 capsule 0     No current facility-administered medications for this visit.         Meaghan Cortés, ATC    "

## (undated) DEVICE — SOL WATER IRRIG 1000ML BOTTLE 2F7114

## (undated) DEVICE — Device

## (undated) DEVICE — GLOVE PROTEXIS POWDER FREE 8.0 ORTHOPEDIC 2D73ET80

## (undated) DEVICE — DRILL ANCHOR AO CONNECTION ARTHREX HTO IBALANCE AR-13434-02

## (undated) DEVICE — DRAPE C-ARMOR 5 SIDED 5523

## (undated) DEVICE — SU ETHIBOND 2 V-37 4X30" MX69G

## (undated) DEVICE — SUCTION MANIFOLD NEPTUNE 2 SYS 4 PORT 0702-020-000

## (undated) DEVICE — PIN FIXATION ARTHREX HTO IBALANCE AR-13422

## (undated) DEVICE — SU VICRYL 1 CT-1 36" J347H

## (undated) DEVICE — ESU GROUND PAD ADULT W/CORD E7507

## (undated) DEVICE — GLOVE PROTEXIS BLUE W/NEU-THERA 8.0  2D73EB80

## (undated) DEVICE — LINEN DRAPE 54X72" 5467

## (undated) DEVICE — PACK ARTHROSCOPY CUSTOM ASC

## (undated) DEVICE — GOWN IMPERVIOUS SPECIALTY XLG/XLONG 32474

## (undated) DEVICE — TUBING SYSTEM ARTHREX PUMP REDEUCE AR-6411

## (undated) DEVICE — DRAPE C-ARM W/STRAPS 42X72" 07-CA104

## (undated) DEVICE — TUBING SYSTEM ARTHREX PATIENT REDEUCE AR-6421

## (undated) DEVICE — HARVESTER OATS SYSTEM ARTHEX 22.5MM ABS-4057D-225

## (undated) DEVICE — SU MONOCRYL 3-0 PS-1 27" Y936H

## (undated) DEVICE — CAST PADDING 6" STERILE 9046S

## (undated) DEVICE — KEYHOLE REAMER ARTHREX HTO IBALANCE AR-13425

## (undated) DEVICE — BONE CLEANING TIP INTERPULSE  0210-010-000

## (undated) DEVICE — SPONGE LAP 18X18" X8435

## (undated) DEVICE — BUR ARTHREX COOLCUT SABRE 4.0MMX13CM AR-8400SR

## (undated) DEVICE — PIN GUIDE ARTHREX 2.4MM DRILL  AR-1250L

## (undated) DEVICE — LINEN TOWEL PACK X5 5464

## (undated) DEVICE — PREP DURAPREP REMOVER 4OZ 8611

## (undated) DEVICE — PREP DURAPREP 26ML APL 8630

## (undated) DEVICE — GLOVE PROTEXIS BLUE W/NEU-THERA 6.5  2D73EB65

## (undated) DEVICE — DRSG STERI STRIP 1/2X4" R1547

## (undated) DEVICE — SU ETHILON 3-0 PS-1 18" 1663H

## (undated) DEVICE — DRAPE TIBURON TOP SHEET 100X60" 29352

## (undated) DEVICE — SOL NACL 0.9% IRRIG 1000ML BOTTLE 2F7124

## (undated) DEVICE — GLOVE PROTEXIS W/NEU-THERA 6.0  2D73TE60

## (undated) DEVICE — BLADE SAW SAGITTAL STRK 18X90X1.27MM HD SYS 6 6118-127-090

## (undated) DEVICE — STRAP KNEE/BODY 31143004

## (undated) DEVICE — SU VICRYL 2-0 CT-1 27" UND J259H

## (undated) DEVICE — GLOVE PROTEXIS POWDER FREE SMT 8.0  2D72PT80X

## (undated) DEVICE — LINEN ORTHO PACK 5446

## (undated) DEVICE — SUCTION IRR SYSTEM W/O TIP INTERPULSE HANDPIECE 0210-100-000

## (undated) DEVICE — SOL NACL 0.9% IRRIG 3000ML BAG 2B7477

## (undated) DEVICE — ESU PENCIL W/SMOKE EVAC NEPTUNE STRYKER 0703-046-000

## (undated) DEVICE — PAD ARMBOARD FOAM EGGCRATE COVIDEN 3114367

## (undated) RX ORDER — GABAPENTIN 300 MG/1
CAPSULE ORAL
Status: DISPENSED
Start: 2022-10-11

## (undated) RX ORDER — HYDROMORPHONE HYDROCHLORIDE 1 MG/ML
INJECTION, SOLUTION INTRAMUSCULAR; INTRAVENOUS; SUBCUTANEOUS
Status: DISPENSED
Start: 2022-10-11

## (undated) RX ORDER — ACETAMINOPHEN 325 MG/1
TABLET ORAL
Status: DISPENSED
Start: 2022-10-11

## (undated) RX ORDER — TRANEXAMIC ACID 650 MG/1
TABLET ORAL
Status: DISPENSED
Start: 2022-10-11

## (undated) RX ORDER — PROPOFOL 10 MG/ML
INJECTION, EMULSION INTRAVENOUS
Status: DISPENSED
Start: 2022-06-07

## (undated) RX ORDER — LABETALOL HYDROCHLORIDE 5 MG/ML
INJECTION, SOLUTION INTRAVENOUS
Status: DISPENSED
Start: 2022-10-11

## (undated) RX ORDER — LIDOCAINE HCL/PF 100 MG/5ML
SYRINGE (ML) INJECTION
Status: DISPENSED
Start: 2022-06-07

## (undated) RX ORDER — OXYCODONE HYDROCHLORIDE 5 MG/1
TABLET ORAL
Status: DISPENSED
Start: 2022-06-07

## (undated) RX ORDER — DEXAMETHASONE SODIUM PHOSPHATE 4 MG/ML
INJECTION, SOLUTION INTRA-ARTICULAR; INTRALESIONAL; INTRAMUSCULAR; INTRAVENOUS; SOFT TISSUE
Status: DISPENSED
Start: 2022-06-07

## (undated) RX ORDER — GLYCOPYRROLATE 0.2 MG/ML
INJECTION INTRAMUSCULAR; INTRAVENOUS
Status: DISPENSED
Start: 2022-06-07

## (undated) RX ORDER — OXYCODONE HYDROCHLORIDE 5 MG/1
TABLET ORAL
Status: DISPENSED
Start: 2022-10-11

## (undated) RX ORDER — ACETAMINOPHEN 325 MG/1
TABLET ORAL
Status: DISPENSED
Start: 2022-06-07

## (undated) RX ORDER — HYDROXYZINE HYDROCHLORIDE 25 MG/1
TABLET, FILM COATED ORAL
Status: DISPENSED
Start: 2022-10-11

## (undated) RX ORDER — KETOROLAC TROMETHAMINE 30 MG/ML
INJECTION, SOLUTION INTRAMUSCULAR; INTRAVENOUS
Status: DISPENSED
Start: 2022-06-07

## (undated) RX ORDER — FENTANYL CITRATE 50 UG/ML
INJECTION, SOLUTION INTRAMUSCULAR; INTRAVENOUS
Status: DISPENSED
Start: 2022-10-11

## (undated) RX ORDER — CEFAZOLIN SODIUM 2 G/50ML
SOLUTION INTRAVENOUS
Status: DISPENSED
Start: 2022-06-07

## (undated) RX ORDER — SODIUM CHLORIDE, SODIUM LACTATE, POTASSIUM CHLORIDE, CALCIUM CHLORIDE 600; 310; 30; 20 MG/100ML; MG/100ML; MG/100ML; MG/100ML
INJECTION, SOLUTION INTRAVENOUS
Status: DISPENSED
Start: 2022-10-11

## (undated) RX ORDER — FENTANYL CITRATE 50 UG/ML
INJECTION, SOLUTION INTRAMUSCULAR; INTRAVENOUS
Status: DISPENSED
Start: 2022-06-07

## (undated) RX ORDER — CEFAZOLIN SODIUM/WATER 2 G/20 ML
SYRINGE (ML) INTRAVENOUS
Status: DISPENSED
Start: 2022-10-11

## (undated) RX ORDER — ONDANSETRON 2 MG/ML
INJECTION INTRAMUSCULAR; INTRAVENOUS
Status: DISPENSED
Start: 2022-06-07